# Patient Record
Sex: FEMALE | Race: WHITE | Employment: UNEMPLOYED | URBAN - METROPOLITAN AREA
[De-identification: names, ages, dates, MRNs, and addresses within clinical notes are randomized per-mention and may not be internally consistent; named-entity substitution may affect disease eponyms.]

---

## 2019-11-29 ENCOUNTER — HOSPITAL ENCOUNTER (EMERGENCY)
Age: 84
Discharge: HOME | End: 2019-11-30
Attending: EMERGENCY MEDICINE
Payer: MEDICARE

## 2019-11-29 VITALS
SYSTOLIC BLOOD PRESSURE: 124 MMHG | RESPIRATION RATE: 18 BRPM | HEART RATE: 66 BPM | TEMPERATURE: 98.5 F | DIASTOLIC BLOOD PRESSURE: 83 MMHG | WEIGHT: 149.7 LBS | OXYGEN SATURATION: 95 % | BODY MASS INDEX: 29.24 KG/M2

## 2019-11-29 DIAGNOSIS — K57.92 DIVERTICULITIS: Primary | ICD-10-CM

## 2019-11-29 LAB
BASOPHILS # BLD: 0 K/UL (ref 0–0.1)
BASOPHILS NFR BLD: 0 % (ref 0–2)
DIFFERENTIAL METHOD BLD: NORMAL
EOSINOPHIL # BLD: 0.1 K/UL (ref 0–0.4)
EOSINOPHIL NFR BLD: 2 % (ref 0–5)
ERYTHROCYTE [DISTWIDTH] IN BLOOD BY AUTOMATED COUNT: 13.5 % (ref 11.6–14.5)
HCT VFR BLD AUTO: 43.6 % (ref 35–45)
HGB BLD-MCNC: 14.1 G/DL (ref 12–16)
LACTATE BLD-SCNC: 1.97 MMOL/L (ref 0.4–2)
LYMPHOCYTES # BLD: 1.8 K/UL (ref 0.9–3.6)
LYMPHOCYTES NFR BLD: 24 % (ref 21–52)
MCH RBC QN AUTO: 30.3 PG (ref 24–34)
MCHC RBC AUTO-ENTMCNC: 32.3 G/DL (ref 31–37)
MCV RBC AUTO: 93.8 FL (ref 74–97)
MONOCYTES # BLD: 0.8 K/UL (ref 0.05–1.2)
MONOCYTES NFR BLD: 10 % (ref 3–10)
NEUTS SEG # BLD: 4.8 K/UL (ref 1.8–8)
NEUTS SEG NFR BLD: 64 % (ref 40–73)
PLATELET # BLD AUTO: 334 K/UL (ref 135–420)
PMV BLD AUTO: 10.1 FL (ref 9.2–11.8)
RBC # BLD AUTO: 4.65 M/UL (ref 4.2–5.3)
WBC # BLD AUTO: 7.5 K/UL (ref 4.6–13.2)

## 2019-11-29 PROCEDURE — 83605 ASSAY OF LACTIC ACID: CPT

## 2019-11-29 PROCEDURE — 83690 ASSAY OF LIPASE: CPT

## 2019-11-29 PROCEDURE — 99284 EMERGENCY DEPT VISIT MOD MDM: CPT

## 2019-11-29 PROCEDURE — 85025 COMPLETE CBC W/AUTO DIFF WBC: CPT

## 2019-11-29 PROCEDURE — 80053 COMPREHEN METABOLIC PANEL: CPT

## 2019-11-29 PROCEDURE — 96374 THER/PROPH/DIAG INJ IV PUSH: CPT

## 2019-11-29 RX ORDER — MEMANTINE HYDROCHLORIDE 5 MG/1
5 TABLET ORAL
COMMUNITY
Start: 2016-05-03

## 2019-11-29 RX ORDER — DONEPEZIL HYDROCHLORIDE 10 MG/1
10 TABLET, FILM COATED ORAL
COMMUNITY
Start: 2019-10-14

## 2019-11-29 RX ORDER — ONDANSETRON 2 MG/ML
8 INJECTION INTRAMUSCULAR; INTRAVENOUS
Status: COMPLETED | OUTPATIENT
Start: 2019-11-29 | End: 2019-11-30

## 2019-11-29 RX ORDER — LEVOTHYROXINE SODIUM 112 UG/1
TABLET ORAL
COMMUNITY
Start: 2019-09-16 | End: 2021-01-30

## 2019-11-29 RX ORDER — LOSARTAN POTASSIUM AND HYDROCHLOROTHIAZIDE 12.5; 5 MG/1; MG/1
TABLET ORAL
COMMUNITY
Start: 2019-09-16

## 2019-11-30 ENCOUNTER — APPOINTMENT (OUTPATIENT)
Dept: CT IMAGING | Age: 84
End: 2019-11-30
Attending: EMERGENCY MEDICINE
Payer: MEDICARE

## 2019-11-30 LAB
ALBUMIN SERPL-MCNC: 3.7 G/DL (ref 3.4–5)
ALBUMIN/GLOB SERPL: 0.9 {RATIO} (ref 0.8–1.7)
ALP SERPL-CCNC: 158 U/L (ref 45–117)
ALT SERPL-CCNC: 18 U/L (ref 13–56)
ANION GAP SERPL CALC-SCNC: 4 MMOL/L (ref 3–18)
AST SERPL-CCNC: 16 U/L (ref 10–38)
BILIRUB SERPL-MCNC: 0.5 MG/DL (ref 0.2–1)
BUN SERPL-MCNC: 24 MG/DL (ref 7–18)
BUN/CREAT SERPL: 16 (ref 12–20)
CALCIUM SERPL-MCNC: 9.4 MG/DL (ref 8.5–10.1)
CHLORIDE SERPL-SCNC: 99 MMOL/L (ref 100–111)
CO2 SERPL-SCNC: 33 MMOL/L (ref 21–32)
CREAT SERPL-MCNC: 1.46 MG/DL (ref 0.6–1.3)
GLOBULIN SER CALC-MCNC: 4.3 G/DL (ref 2–4)
GLUCOSE SERPL-MCNC: 175 MG/DL (ref 74–99)
LIPASE SERPL-CCNC: 326 U/L (ref 73–393)
POTASSIUM SERPL-SCNC: 3.3 MMOL/L (ref 3.5–5.5)
PROT SERPL-MCNC: 8 G/DL (ref 6.4–8.2)
SODIUM SERPL-SCNC: 136 MMOL/L (ref 136–145)

## 2019-11-30 PROCEDURE — 74011250636 HC RX REV CODE- 250/636: Performed by: EMERGENCY MEDICINE

## 2019-11-30 PROCEDURE — 74011636320 HC RX REV CODE- 636/320: Performed by: EMERGENCY MEDICINE

## 2019-11-30 PROCEDURE — 74177 CT ABD & PELVIS W/CONTRAST: CPT

## 2019-11-30 RX ORDER — METRONIDAZOLE 500 MG/1
500 TABLET ORAL 2 TIMES DAILY
Qty: 14 TAB | Refills: 0 | Status: SHIPPED | OUTPATIENT
Start: 2019-11-30 | End: 2019-12-07

## 2019-11-30 RX ORDER — CIPROFLOXACIN 500 MG/1
500 TABLET ORAL 2 TIMES DAILY
Qty: 14 TAB | Refills: 0 | Status: SHIPPED | OUTPATIENT
Start: 2019-11-30 | End: 2019-12-07

## 2019-11-30 RX ADMIN — ONDANSETRON 8 MG: 2 INJECTION INTRAMUSCULAR; INTRAVENOUS at 00:25

## 2019-11-30 RX ADMIN — IOPAMIDOL 100 ML: 612 INJECTION, SOLUTION INTRAVENOUS at 00:36

## 2019-11-30 RX ADMIN — SODIUM CHLORIDE, SODIUM LACTATE, POTASSIUM CHLORIDE, AND CALCIUM CHLORIDE 1000 ML: 600; 310; 30; 20 INJECTION, SOLUTION INTRAVENOUS at 02:03

## 2019-11-30 NOTE — DISCHARGE INSTRUCTIONS
Patient Education        Diverticulitis: Care Instructions  Your Care Instructions    Diverticulitis occurs when pouches form in the wall of the colon and become inflamed or infected. It can be very painful. Doctors aren't sure what causes diverticulitis. There is no proof that foods such as nuts, seeds, or berries cause it or make it worse. A low-fiber diet may cause the colon to work harder to push stool forward. Pouches may form because of this extra work. It may be hard to think about healthy eating while you're in pain. But as you recover, you might think about how you can use healthy eating for overall better health. Healthy eating may help you avoid future attacks. Follow-up care is a key part of your treatment and safety. Be sure to make and go to all appointments, and call your doctor if you are having problems. It's also a good idea to know your test results and keep a list of the medicines you take. How can you care for yourself at home? · Drink plenty of fluids, enough so that your urine is light yellow or clear like water. If you have kidney, heart, or liver disease and have to limit fluids, talk with your doctor before you increase the amount of fluids you drink. · Stick to liquids or a bland diet (plain rice, bananas, dry toast or crackers, applesauce) until you are feeling better. Then you can return to regular foods and gradually increase the amount of fiber in your diet. · Use a heating pad set on low on your belly to relieve mild cramps and pain. · Get extra rest until you are feeling better. · Be safe with medicines. Read and follow all instructions on the label. ? If the doctor gave you a prescription medicine for pain, take it as prescribed. ? If you are not taking a prescription pain medicine, ask your doctor if you can take an over-the-counter medicine. · If your doctor prescribed antibiotics, take them as directed. Do not stop taking them just because you feel better.  You need to take the full course of antibiotics. To prevent future attacks of diverticulitis  · Avoid constipation:  ? Include fruits, vegetables, beans, and whole grains in your diet each day. These foods are high in fiber. ? Drink plenty of fluids, enough so that your urine is light yellow or clear like water. If you have kidney, heart, or liver disease and have to limit fluids, talk with your doctor before you increase the amount of fluids you drink. ? Get some exercise every day. Build up slowly to 30 to 60 minutes a day on 5 or more days of the week. ? Take a fiber supplement, such as Citrucel or Metamucil, every day if needed. Read and follow all instructions on the label. ? Schedule time each day for a bowel movement. Having a daily routine may help. Take your time and do not strain when having a bowel movement. When should you call for help? Call your doctor now or seek immediate medical care if:    · You have a fever.     · You are vomiting.     · You have new or worse belly pain.     · You cannot pass stools or gas.    Watch closely for changes in your health, and be sure to contact your doctor if you have any problems. Where can you learn more? Go to http://juarez-edwar.info/. Enter H901 in the search box to learn more about \"Diverticulitis: Care Instructions. \"  Current as of: November 7, 2018  Content Version: 12.2  © 2847-0423 [employer]. Care instructions adapted under license by [employer] (which disclaims liability or warranty for this information). If you have questions about a medical condition or this instruction, always ask your healthcare professional. [place] [address] any warranty or liability for your use of this information.

## 2019-11-30 NOTE — ED TRIAGE NOTES
A&O female arrived via EMS with c/o n/v and fatigue. Patient denies abdominal pain. Denies diarrhea/constipation. States she began vomiting today but had some vomiting a few days ago as well.

## 2019-12-15 NOTE — ED PROVIDER NOTES
EMERGENCY DEPARTMENT HISTORY AND PHYSICAL EXAM      Date: 11/29/2019  Patient Name: Charisma Renteria    History of Presenting Illness     Chief Complaint   Patient presents with    Vomiting    Nausea       History (Context): Charisma Renteria is a 80 y.o. Sera Crank with complicated set of comorbid conditions as noted below, who presents with insidious onset, severe, persistent nausea and vomiting. This is associated with fatigue. There are no exacerbating/relieving features or other associated symptoms. On review of systems, the patient denies fever, chills, abdominal pain, diarrhea, back pain, chest pain, shortness of breath, diaphoresis, rashes, tick bite, recent travel. PCP: Pedro Lott MD    Current Outpatient Medications   Medication Sig Dispense Refill    memantine (NAMENDA) 5 mg tablet Take 5 mg by mouth.  donepezil (ARICEPT) 10 mg tablet       losartan-hydroCHLOROthiazide (HYZAAR) 50-12.5 mg per tablet       levothyroxine (SYNTHROID) 112 mcg tablet       ergocalciferol (VITAMIN D2) 50,000 unit capsule Take 50,000 Units by mouth every seven (7) days.  glimepiride (AMARYL) 4 mg tablet Take 4 mg by mouth every morning.  simvastatin (ZOCOR) 5 mg tablet Take 5 mg by mouth nightly.  ranitidine (ZANTAC) 150 mg tablet Take 150 mg by mouth as needed.  LORazepam (ATIVAN) 0.5 mg tablet Take 0.5 mg by mouth two (2) times daily as needed.  oxybutynin (DITROPAN) 5 mg tablet Take 5 mg by mouth three (3) times daily. Past History     Past Medical History:  Past Medical History:   Diagnosis Date    [facility]     Diabetes [facility]     Hypertension     Hypothyroid        Past Surgical History:  Past Surgical History:   Procedure Laterality Date    BREAST SURGERY PROCEDURE UNLISTED      HX APPENDECTOMY      HX MASTECTOMY         Family History:  History reviewed. No pertinent family history.     Social History:  Social History     Tobacco Use    Smoking status: Never Smoker    Smokeless tobacco: Never Used   Substance Use Topics    Alcohol use: No    Drug use: No       Allergies:  No Known Allergies      Review of Systems   As per HPI, otherwise reviewed and negative. Physical Exam     Vitals:    11/29/19 2305   BP: 124/83   Pulse: 66   Resp: 18   Temp: 98.5 °F (36.9 °C)   SpO2: 95%   Weight: 67.9 kg (149 lb 11.2 oz)       Gen: chronically ill-appearing in NAD  HEENT: Normocephalic, sclera anicteric  Cardiovascular: Normal rate, regular rhythm, no murmurs, rubs, gallops. Pulses intact and equal distally. Pulmonary: No respiratory distress. No stridor. Clear lungs. ABD: Soft, nontender, nondistended, +BS. Neuro: Alert. Normal speech. Normal mentation. Psych: Normal thought content and thought processes. : No CVA tenderness  EXT: Moves all extremities well. No cyanosis or clubbing. Skin: Warm and well-perfused. Diagnostic Study Results     Labs -   No results found for this or any previous visit (from the past 12 hour(s)). Radiologic Studies -   CT ABD PELV W CONT   Final Result   IMPRESSION:      1. Diffuse diverticulosis without definite acute diverticulitis. However, there   is questionable subtle pericolonic stranding adjacent to the mid to distal   sigmoid colon which may be related to scarring from prior hysterectomy although   very early/mild acute diverticulitis is possible. 2. Otherwise, no acute inflammatory process in the abdomen or pelvis. 3. Mild bibasilar atelectasis. No consolidation, effusion, or pneumothorax. 4. Small hiatal hernia and additional chronic findings as above. 5. Prior hysterectomy and cholecystectomy. 6. Evidence of pulmonary arterial hypertension, a completely assessed. Initial preliminary report was provided to the ED by the on-call radiology   resident. Initial preliminary report was provided to the ED by the on-call radiology   resident.         CT Results  (Last 48 hours)    None CXR Results  (Last 48 hours)    None            Medical Decision Making   I am the first provider for this patient. I reviewed the vital signs, available nursing notes, past medical history, past surgical history, family history and social history. Vital Signs-Reviewed the patient's vital signs. EKG: Interpreted by myself. Records Reviewed: By myself personally on initial evaluation    MDM:   Patient presents with nausea and vomiting. Exam significant for chronically ill appealing elderly woman. DDX considered: Gastroparesis, gastroenteritis, cyclical vomiting syndrome, gastritis, peptic ulcer disease, cholecystitis, choledocholithiasis, SBO, functional abdominal pain, acute intermittent porphyria, gastroparesis  DDX thought to be less likely but also considered due to high risk condition: Cholangitis, mesenteric ischemia. Plan:   Pain Control  Antiemetics  Close Observation    Orders as below:  Orders Placed This Encounter    CT ABD PELV W CONT    CBC WITH AUTOMATED DIFF    COMPREHENSIVE METABOLIC PANEL    LIPASE    POC LACTIC ACID    donepezil (ARICEPT) 10 mg tablet    losartan-hydroCHLOROthiazide (HYZAAR) 50-12.5 mg per tablet    memantine (NAMENDA) 5 mg tablet    levothyroxine (SYNTHROID) 112 mcg tablet    lactated ringers bolus infusion 1,000 mL    ondansetron (ZOFRAN) injection 8 mg    iopamidol (ISOVUE 300) 61 % contrast injection 100 mL    ciprofloxacin HCl (CIPRO) 500 mg tablet    metroNIDAZOLE (FLAGYL) 500 mg tablet        ED Course:   CT demonstrates likely diverticulitis. Patient will be started on antibiotics. Disposition:  Discharge home    DISCHARGE NOTE:   Pt has been reexamined. Patient has no new complaints, changes, or physical findings. Care plan outlined and precautions discussed. Results were reviewed with the patient. All medications were reviewed with the patient; will d/c home with antibiotics. All of pt's questions and concerns were addressed. Alarm symptoms and return precautions were discussed in detail with the patient. The patient demonstrates adequate understanding. Patient was instructed and agrees to follow up with PCP, as well as to return to the ED upon further deterioration. Patient is ready to go home. The patient understands and agrees with this plan. Patient is very happy with our care. Follow-up Information     Follow up With Specialties Details Why [address] [facility] EMERGENCY DEPT Emergency Medicine  As needed, If symptoms worsen [address]  [phone]          Discharge Medication List as of 11/30/2019  2:20 AM      START taking these medications    Details   ciprofloxacin HCl (CIPRO) 500 mg tablet Take 1 Tab by mouth two (2) times a day for 7 days. , Print, Disp-14 Tab, R-0      metroNIDAZOLE (FLAGYL) 500 mg tablet Take 1 Tab by mouth two (2) times a day for 7 days. , Print, Disp-14 Tab, R-0         CONTINUE these medications which have NOT CHANGED    Details   memantine (NAMENDA) 5 mg tablet Take 5 mg by mouth., Historical Med      donepezil (ARICEPT) 10 mg tablet Historical Med      losartan-hydroCHLOROthiazide (HYZAAR) 50-12.5 mg per tablet Historical Med      levothyroxine (SYNTHROID) 112 mcg tablet Historical Med      ergocalciferol (VITAMIN D2) 50,000 unit capsule Take 50,000 Units by mouth every seven (7) days. , Historical Med      glimepiride (AMARYL) 4 mg tablet Take 4 mg by mouth every morning. Historical Med, 4 mg      simvastatin (ZOCOR) 5 mg tablet Take 5 mg by mouth nightly. Historical Med, 5 mg      ranitidine (ZANTAC) 150 mg tablet Take 150 mg by mouth as needed. Historical Med, 150 mg      LORazepam (ATIVAN) 0.5 mg tablet Take 0.5 mg by mouth two (2) times daily as needed. Historical Med, 0.5 mg      oxybutynin (DITROPAN) 5 mg tablet Take 5 mg by mouth three (3) times daily. Historical Med, 5 mg             Procedures:  Procedures      Critical Care Time:       Diagnosis     Clinical Impression:   1. Diverticulitis        Signed,  Elena Crane MD  Emergency Physician  [facility]    As a voice dictation software was utilized to dictate this note, minor word transpositions can occur. I apologize for confusing wording and typographic errors. Please feel free to contact me for clarification.

## 2021-01-26 ENCOUNTER — APPOINTMENT (OUTPATIENT)
Dept: GENERAL RADIOLOGY | Age: 86
DRG: 312 | End: 2021-01-26
Attending: EMERGENCY MEDICINE
Payer: MEDICARE

## 2021-01-26 ENCOUNTER — HOSPITAL ENCOUNTER (INPATIENT)
Age: 86
LOS: 2 days | Discharge: HOME HEALTH | DRG: 312 | End: 2021-01-30
Attending: EMERGENCY MEDICINE | Admitting: INTERNAL MEDICINE
Payer: MEDICARE

## 2021-01-26 ENCOUNTER — APPOINTMENT (OUTPATIENT)
Dept: CT IMAGING | Age: 86
DRG: 312 | End: 2021-01-26
Attending: EMERGENCY MEDICINE
Payer: MEDICARE

## 2021-01-26 DIAGNOSIS — R00.1 BRADYCARDIA: ICD-10-CM

## 2021-01-26 DIAGNOSIS — S61.512A LACERATION OF LEFT WRIST, INITIAL ENCOUNTER: ICD-10-CM

## 2021-01-26 DIAGNOSIS — R55 SYNCOPE, UNSPECIFIED SYNCOPE TYPE: Primary | ICD-10-CM

## 2021-01-26 LAB
ALBUMIN SERPL-MCNC: 3.5 G/DL (ref 3.4–5)
ALBUMIN/GLOB SERPL: 0.8 {RATIO} (ref 0.8–1.7)
ALP SERPL-CCNC: 120 U/L (ref 45–117)
ALT SERPL-CCNC: 15 U/L (ref 13–56)
ANION GAP SERPL CALC-SCNC: 3 MMOL/L (ref 3–18)
AST SERPL-CCNC: 21 U/L (ref 10–38)
BASOPHILS # BLD: 0 K/UL (ref 0–0.1)
BASOPHILS NFR BLD: 0 % (ref 0–2)
BILIRUB SERPL-MCNC: 0.5 MG/DL (ref 0.2–1)
BUN SERPL-MCNC: 24 MG/DL (ref 7–18)
BUN/CREAT SERPL: 16 (ref 12–20)
CALCIUM SERPL-MCNC: 9.2 MG/DL (ref 8.5–10.1)
CHLORIDE SERPL-SCNC: 105 MMOL/L (ref 100–111)
CK MB CFR SERPL CALC: 1 % (ref 0–4)
CK MB SERPL-MCNC: 1.3 NG/ML (ref 5–25)
CK SERPL-CCNC: 125 U/L (ref 26–192)
CO2 SERPL-SCNC: 31 MMOL/L (ref 21–32)
CREAT SERPL-MCNC: 1.51 MG/DL (ref 0.6–1.3)
DIFFERENTIAL METHOD BLD: ABNORMAL
EOSINOPHIL # BLD: 0 K/UL (ref 0–0.4)
EOSINOPHIL NFR BLD: 1 % (ref 0–5)
ERYTHROCYTE [DISTWIDTH] IN BLOOD BY AUTOMATED COUNT: 13.7 % (ref 11.6–14.5)
GLOBULIN SER CALC-MCNC: 4.6 G/DL (ref 2–4)
GLUCOSE SERPL-MCNC: 143 MG/DL (ref 74–99)
HCT VFR BLD AUTO: 41 % (ref 35–45)
HGB BLD-MCNC: 13 G/DL (ref 12–16)
LYMPHOCYTES # BLD: 1.2 K/UL (ref 0.9–3.6)
LYMPHOCYTES NFR BLD: 16 % (ref 21–52)
MCH RBC QN AUTO: 30.7 PG (ref 24–34)
MCHC RBC AUTO-ENTMCNC: 31.7 G/DL (ref 31–37)
MCV RBC AUTO: 96.7 FL (ref 74–97)
MONOCYTES # BLD: 0.9 K/UL (ref 0.05–1.2)
MONOCYTES NFR BLD: 12 % (ref 3–10)
NEUTS SEG # BLD: 5.4 K/UL (ref 1.8–8)
NEUTS SEG NFR BLD: 71 % (ref 40–73)
PLATELET # BLD AUTO: 281 K/UL (ref 135–420)
PMV BLD AUTO: 10.2 FL (ref 9.2–11.8)
POTASSIUM SERPL-SCNC: 3.8 MMOL/L (ref 3.5–5.5)
PROT SERPL-MCNC: 8.1 G/DL (ref 6.4–8.2)
RBC # BLD AUTO: 4.24 M/UL (ref 4.2–5.3)
SODIUM SERPL-SCNC: 139 MMOL/L (ref 136–145)
TROPONIN I SERPL-MCNC: 0.03 NG/ML (ref 0–0.04)
WBC # BLD AUTO: 7.6 K/UL (ref 4.6–13.2)

## 2021-01-26 PROCEDURE — 84443 ASSAY THYROID STIM HORMONE: CPT

## 2021-01-26 PROCEDURE — 81001 URINALYSIS AUTO W/SCOPE: CPT

## 2021-01-26 PROCEDURE — 80053 COMPREHEN METABOLIC PANEL: CPT

## 2021-01-26 PROCEDURE — 71045 X-RAY EXAM CHEST 1 VIEW: CPT

## 2021-01-26 PROCEDURE — 93005 ELECTROCARDIOGRAM TRACING: CPT

## 2021-01-26 PROCEDURE — 82553 CREATINE MB FRACTION: CPT

## 2021-01-26 PROCEDURE — 99285 EMERGENCY DEPT VISIT HI MDM: CPT

## 2021-01-26 PROCEDURE — 70450 CT HEAD/BRAIN W/O DYE: CPT

## 2021-01-26 PROCEDURE — 85025 COMPLETE CBC W/AUTO DIFF WBC: CPT

## 2021-01-27 ENCOUNTER — APPOINTMENT (OUTPATIENT)
Dept: NON INVASIVE DIAGNOSTICS | Age: 86
DRG: 312 | End: 2021-01-27
Attending: HOSPITALIST
Payer: MEDICARE

## 2021-01-27 LAB
APPEARANCE UR: ABNORMAL
ATRIAL RATE: 52 BPM
BACTERIA URNS QL MICRO: ABNORMAL /HPF
BILIRUB UR QL: NEGATIVE
CALCULATED P AXIS, ECG09: 59 DEGREES
CALCULATED R AXIS, ECG10: 15 DEGREES
CALCULATED T AXIS, ECG11: -162 DEGREES
COLOR UR: YELLOW
DIAGNOSIS, 93000: NORMAL
ECHO PV REGURGITANT MAX VELOCITY: 349.34 CM/S
ECHO TV REGURGITANT MAX VELOCITY: 349.34 CM/S
ECHO TV REGURGITANT PEAK GRADIENT: 48.8 MMHG
EPITH CASTS URNS QL MICRO: ABNORMAL /LPF (ref 0–5)
EST. AVERAGE GLUCOSE BLD GHB EST-MCNC: 117 MG/DL
GLUCOSE BLD STRIP.AUTO-MCNC: 107 MG/DL (ref 70–110)
GLUCOSE BLD STRIP.AUTO-MCNC: 122 MG/DL (ref 70–110)
GLUCOSE BLD STRIP.AUTO-MCNC: 87 MG/DL (ref 70–110)
GLUCOSE BLD STRIP.AUTO-MCNC: 87 MG/DL (ref 70–110)
GLUCOSE UR STRIP.AUTO-MCNC: NEGATIVE MG/DL
HBA1C MFR BLD: 5.7 % (ref 4.2–5.6)
HGB UR QL STRIP: NEGATIVE
HYALINE CASTS URNS QL MICRO: ABNORMAL /LPF (ref 0–2)
KETONES UR QL STRIP.AUTO: ABNORMAL MG/DL
LEUKOCYTE ESTERASE UR QL STRIP.AUTO: ABNORMAL
NITRITE UR QL STRIP.AUTO: POSITIVE
P-R INTERVAL, ECG05: 152 MS
PH UR STRIP: 5 [PH] (ref 5–8)
PROT UR STRIP-MCNC: 30 MG/DL
Q-T INTERVAL, ECG07: 500 MS
QRS DURATION, ECG06: 150 MS
QTC CALCULATION (BEZET), ECG08: 465 MS
RBC #/AREA URNS HPF: ABNORMAL /HPF (ref 0–5)
SP GR UR REFRACTOMETRY: 1.03 (ref 1–1.03)
TSH SERPL DL<=0.05 MIU/L-ACNC: 5.14 UIU/ML (ref 0.36–3.74)
URATE CRY URNS QL MICRO: ABNORMAL
UROBILINOGEN UR QL STRIP.AUTO: 1 EU/DL (ref 0.2–1)
VENTRICULAR RATE, ECG03: 52 BPM
WBC URNS QL MICRO: ABNORMAL /HPF (ref 0–4)

## 2021-01-27 PROCEDURE — 2709999900 HC NON-CHARGEABLE SUPPLY

## 2021-01-27 PROCEDURE — 75810000293 HC SIMP/SUPERF WND  RPR

## 2021-01-27 PROCEDURE — 82962 GLUCOSE BLOOD TEST: CPT

## 2021-01-27 PROCEDURE — 83036 HEMOGLOBIN GLYCOSYLATED A1C: CPT

## 2021-01-27 PROCEDURE — 74011000258 HC RX REV CODE- 258: Performed by: INTERNAL MEDICINE

## 2021-01-27 PROCEDURE — 74011250636 HC RX REV CODE- 250/636: Performed by: HOSPITALIST

## 2021-01-27 PROCEDURE — 0HQGXZZ REPAIR LEFT HAND SKIN, EXTERNAL APPROACH: ICD-10-PCS | Performed by: HOSPITALIST

## 2021-01-27 PROCEDURE — 96372 THER/PROPH/DIAG INJ SC/IM: CPT

## 2021-01-27 PROCEDURE — 74011250636 HC RX REV CODE- 250/636: Performed by: INTERNAL MEDICINE

## 2021-01-27 PROCEDURE — 99218 HC RM OBSERVATION: CPT

## 2021-01-27 PROCEDURE — 93321 DOPPLER ECHO F-UP/LMTD STD: CPT

## 2021-01-27 PROCEDURE — 74011250637 HC RX REV CODE- 250/637: Performed by: HOSPITALIST

## 2021-01-27 PROCEDURE — 96374 THER/PROPH/DIAG INJ IV PUSH: CPT

## 2021-01-27 RX ORDER — MAGNESIUM SULFATE 100 %
4 CRYSTALS MISCELLANEOUS AS NEEDED
Status: DISCONTINUED | OUTPATIENT
Start: 2021-01-27 | End: 2021-01-31 | Stop reason: HOSPADM

## 2021-01-27 RX ORDER — ATORVASTATIN CALCIUM 10 MG/1
10 TABLET, FILM COATED ORAL
Status: DISCONTINUED | OUTPATIENT
Start: 2021-01-27 | End: 2021-01-31 | Stop reason: HOSPADM

## 2021-01-27 RX ORDER — ENOXAPARIN SODIUM 100 MG/ML
30 INJECTION SUBCUTANEOUS DAILY
Status: DISCONTINUED | OUTPATIENT
Start: 2021-01-27 | End: 2021-01-28 | Stop reason: DRUGHIGH

## 2021-01-27 RX ORDER — PROMETHAZINE HYDROCHLORIDE 25 MG/1
12.5 TABLET ORAL
Status: DISCONTINUED | OUTPATIENT
Start: 2021-01-27 | End: 2021-01-31 | Stop reason: HOSPADM

## 2021-01-27 RX ORDER — SODIUM CHLORIDE 0.9 % (FLUSH) 0.9 %
5-40 SYRINGE (ML) INJECTION EVERY 8 HOURS
Status: DISCONTINUED | OUTPATIENT
Start: 2021-01-27 | End: 2021-01-31 | Stop reason: HOSPADM

## 2021-01-27 RX ORDER — MEMANTINE HYDROCHLORIDE 5 MG/1
5 TABLET ORAL DAILY
Status: DISCONTINUED | OUTPATIENT
Start: 2021-01-27 | End: 2021-01-31 | Stop reason: HOSPADM

## 2021-01-27 RX ORDER — DONEPEZIL HYDROCHLORIDE 5 MG/1
5 TABLET, FILM COATED ORAL DAILY
Status: DISCONTINUED | OUTPATIENT
Start: 2021-01-27 | End: 2021-01-31 | Stop reason: HOSPADM

## 2021-01-27 RX ORDER — SODIUM CHLORIDE 9 MG/ML
75 INJECTION, SOLUTION INTRAVENOUS CONTINUOUS
Status: DISCONTINUED | OUTPATIENT
Start: 2021-01-27 | End: 2021-01-30

## 2021-01-27 RX ORDER — POLYETHYLENE GLYCOL 3350 17 G/17G
17 POWDER, FOR SOLUTION ORAL DAILY PRN
Status: DISCONTINUED | OUTPATIENT
Start: 2021-01-27 | End: 2021-01-31 | Stop reason: HOSPADM

## 2021-01-27 RX ORDER — ONDANSETRON 2 MG/ML
4 INJECTION INTRAMUSCULAR; INTRAVENOUS
Status: DISCONTINUED | OUTPATIENT
Start: 2021-01-27 | End: 2021-01-31 | Stop reason: HOSPADM

## 2021-01-27 RX ORDER — SODIUM CHLORIDE 0.9 % (FLUSH) 0.9 %
5-40 SYRINGE (ML) INJECTION AS NEEDED
Status: DISCONTINUED | OUTPATIENT
Start: 2021-01-27 | End: 2021-01-31 | Stop reason: HOSPADM

## 2021-01-27 RX ORDER — ACETAMINOPHEN 650 MG/1
650 SUPPOSITORY RECTAL
Status: DISCONTINUED | OUTPATIENT
Start: 2021-01-27 | End: 2021-01-31 | Stop reason: HOSPADM

## 2021-01-27 RX ORDER — ACETAMINOPHEN 325 MG/1
650 TABLET ORAL
Status: DISCONTINUED | OUTPATIENT
Start: 2021-01-27 | End: 2021-01-31 | Stop reason: HOSPADM

## 2021-01-27 RX ORDER — INSULIN LISPRO 100 [IU]/ML
INJECTION, SOLUTION INTRAVENOUS; SUBCUTANEOUS
Status: DISCONTINUED | OUTPATIENT
Start: 2021-01-27 | End: 2021-01-31 | Stop reason: HOSPADM

## 2021-01-27 RX ORDER — DEXTROSE MONOHYDRATE 25 G/50ML
25-50 INJECTION, SOLUTION INTRAVENOUS AS NEEDED
Status: DISCONTINUED | OUTPATIENT
Start: 2021-01-27 | End: 2021-01-31 | Stop reason: HOSPADM

## 2021-01-27 RX ADMIN — MEMANTINE 5 MG: 5 TABLET ORAL at 08:27

## 2021-01-27 RX ADMIN — Medication 10 ML: at 05:07

## 2021-01-27 RX ADMIN — ATORVASTATIN CALCIUM 10 MG: 10 TABLET, FILM COATED ORAL at 21:13

## 2021-01-27 RX ADMIN — LEVOTHYROXINE SODIUM 100 MCG: 0.03 TABLET ORAL at 08:27

## 2021-01-27 RX ADMIN — Medication 10 ML: at 14:12

## 2021-01-27 RX ADMIN — Medication 10 ML: at 22:00

## 2021-01-27 RX ADMIN — CEFTRIAXONE 1 G: 1 INJECTION, POWDER, FOR SOLUTION INTRAMUSCULAR; INTRAVENOUS at 08:26

## 2021-01-27 RX ADMIN — SODIUM CHLORIDE 75 ML/HR: 900 INJECTION, SOLUTION INTRAVENOUS at 05:07

## 2021-01-27 RX ADMIN — ENOXAPARIN SODIUM 30 MG: 30 INJECTION SUBCUTANEOUS at 08:27

## 2021-01-27 RX ADMIN — DONEPEZIL HYDROCHLORIDE 5 MG: 5 TABLET, FILM COATED ORAL at 09:31

## 2021-01-27 NOTE — PROGRESS NOTES
Problem: Falls - Risk of  Goal: *Absence of Falls  Description: Document Earma Yazmin Fall Risk and appropriate interventions in the flowsheet.   Outcome: Progressing Towards Goal  Note: Fall Risk Interventions:  Mobility Interventions: Bed/chair exit alarm, Patient to call before getting OOB, Strengthening exercises (ROM-active/passive), Utilize walker, cane, or other assistive device    Mentation Interventions: Door open when patient unattended, Bed/chair exit alarm, Adequate sleep, hydration, pain control, More frequent rounding, Toileting rounds, Update white board    Medication Interventions: Evaluate medications/consider consulting pharmacy, Bed/chair exit alarm    Elimination Interventions: Call light in reach, Bed/chair exit alarm, Toileting schedule/hourly rounds, Stay With Me (per policy)

## 2021-01-27 NOTE — PROGRESS NOTES
This nurse Preceptoring and [address] Javier Bean RN ([place]). Patient pulled out her RA peripheral IV; see lines. Patient refusing new peripheral IV placement said \"get out of here. \" Call bell w/in reach.

## 2021-01-27 NOTE — ED NOTES
TRANSFER - OUT REPORT:    Verbal report given to [facility] RN on [address]  being transferred to 2200 (unit) for routine progression of care       Report consisted of patients Situation, Background, Assessment and   Recommendations(SBAR). Information from the following report(s) SBAR, ED Summary, [facility] and Recent Results was reviewed with the receiving nurse. Lines:       Opportunity for questions and clarification was provided.       Patient transported with:   Monitor  Registered Nurse

## 2021-01-27 NOTE — H&P
Medicine History and Physical    Patient: Demar Bradley   Age:  80 y.o. Assessment   Principal Problem:    Syncope (5/18/2014)    Active Problems:    DM II (diabetes mellitus, type II[facility] (5/18/2014)          Plan     Syncope   -echo, orthostatics, tele monitor, IVF    DM   - SSI    Hold any HTN meds    DISPO    Anticipated Date of Discharge: 1 days  Anticipated Disposition (home, SNF) :home    Chief Complaint:   Chief Complaint   Patient presents with    Syncope         HPI:   Demar Bradley is a 80y.o. year old female who presents with syncope. She apparently has \"passed out\" twice in about a week both about a minute each at the dinner table. No symptoms previously were reported and post syncope she was confused but returned to baseline. This am when seen she is lethargic      Review of Systems - positive responses in bold type   Constitutional: Negative for fever, chills, diaphoresis and unexpected weight change. HENT: Negative for ear pain, congestion, sore throat, rhinorrhea, drooling, trouble swallowing, neck pain and tinnitus. Eyes: Negative for photophobia, pain, redness and visual disturbance. Respiratory: negative for shortness of breath, cough, choking, chest tightness, wheezing or stridor. Cardiovascular: Negative for chest pain, palpitations and leg swelling. Gastrointestinal: Negative for nausea, vomiting, abdominal pain, diarrhea, constipation, blood in stool, abdominal distention and anal bleeding. Genitourinary: Negative for dysuria, urgency, frequency, hematuria, flank pain and difficulty urinating. Musculoskeletal: Negative for back pain and arthralgias. Skin: Negative for color change, rash and wound. Neurological: Negative for dizziness, seizures, syncope, speech difficulty, light-headedness or headaches. Hematological: Does not bruise/bleed easily.    Psychiatric/Behavioral: Negative for suicidal ideas, hallucinations, behavioral problems, self-injury or agitation       Past Medical History:  Past Medical History:   Diagnosis Date    [facility]     [facility]     Hypertension     Hypothyroid        Past Surgical History:  Past Surgical History:   Procedure Laterality Date    HX APPENDECTOMY      HX MASTECTOMY      MS BREAST SURGERY PROCEDURE UNLISTED         Family History:  History reviewed. No pertinent family history. Social History:  Social History     Socioeconomic History    Marital status: SINGLE     Spouse name: Not on file    Number of children: Not on file    Years of education: Not on file    Highest education level: Not on file   Tobacco Use    Smoking status: Never Smoker    Smokeless tobacco: Never Used   Substance and Sexual Activity    Alcohol use: No    Drug use: No       Home Medications:  Prior to Admission medications    Medication Sig Start Date End Date Taking? Authorizing Provider   donepezil (ARICEPT) 10 mg tablet  10/14/19   Other, MD Veronica   losartan-hydroCHLOROthiazide (HYZAAR) 50-12.5 mg per tablet  9/16/19   Other, MD Veronica   memantine (NAMENDA) 5 mg tablet Take 5 mg by mouth. 5/3/16   Other, MD Veronica   levothyroxine (SYNTHROID) 112 mcg tablet  9/16/19   Other, MD Veronica   ergocalciferol (VITAMIN D2) 50,000 unit capsule Take 50,000 Units by mouth every seven (7) days. Other, MD Veronica   glimepiride (AMARYL) 4 mg tablet Take 4 mg by mouth every morning. Provider, Historical   simvastatin (ZOCOR) 5 mg tablet Take 5 mg by mouth nightly. Provider, Historical   ranitidine (ZANTAC) 150 mg tablet Take 150 mg by mouth as needed. Provider, Historical   LORazepam (ATIVAN) 0.5 mg tablet Take 0.5 mg by mouth two (2) times daily as needed. Provider, Historical   oxybutynin (DITROPAN) 5 mg tablet Take 5 mg by mouth three (3) times daily.     Provider, Historical       Allergies:  No Known Allergies        Physical Exam:     Visit Vitals  BP (!) 98/52 (BP 1 Location: Right arm, BP Patient Position: At rest)   Pulse (!) 53   Temp 97.8 °F (36.6 °C)   Resp 16   Ht 5' (1.524 m)   Wt 67.6 kg (149 lb)   SpO2 98%   BMI 29.10 kg/m²       Physical Exam:  General appearance: lethargic, no distress, appears stated age  Head: Normocephalic, without obvious abnormality, atraumatic  Neck: supple, trachea midline  Lungs: clear to auscultation bilaterally  Heart: sinus lucia  Abdomen: soft, non-tender. Bowel sounds normal. No masses,  no organomegaly  Extremities: extremities normal, atraumatic, no cyanosis or edema  Skin: Skin color, texture, turgor normal. No rashes or lesions  Neurologic: lethargic , nonfocal    Intake and Output:  Current Shift:  No intake/output data recorded. Last three shifts:  No intake/output data recorded.     Lab/Data Reviewed:  CMP:   Lab Results   Component Value Date/Time     01/26/2021 10:36 PM    K 3.8 01/26/2021 10:36 PM     01/26/2021 10:36 PM    CO2 31 01/26/2021 10:36 PM    AGAP 3 01/26/2021 10:36 PM     (H) 01/26/2021 10:36 PM    BUN 24 (H) 01/26/2021 10:36 PM    CREA 1.51 (H) 01/26/2021 10:36 PM    GFRAA 39 (L) 01/26/2021 10:36 PM    GFRNA 32 (L) 01/26/2021 10:36 PM    CA 9.2 01/26/2021 10:36 PM    ALB 3.5 01/26/2021 10:36 PM    TP 8.1 01/26/2021 10:36 PM    GLOB 4.6 (H) 01/26/2021 10:36 PM    AGRAT 0.8 01/26/2021 10:36 PM    ALT 15 01/26/2021 10:36 PM     CBC:   Lab Results   Component Value Date/Time    WBC 7.6 01/26/2021 11:25 PM    HGB 13.0 01/26/2021 11:25 PM    HCT 41.0 01/26/2021 11:25 PM     01/26/2021 11:25 PM     All Cardiac Markers in the last 24 hours:   Lab Results   Component Value Date/Time     01/26/2021 10:36 PM    CKMB 1.3 01/26/2021 10:36 PM    CKND1 1.0 01/26/2021 10:36 PM    TROIQ 0.03 01/26/2021 10:36 PM       Valeria Clayton MD    January 27, 2021

## 2021-01-27 NOTE — PROGRESS NOTES
Problem: Discharge Planning  Goal: *Discharge to safe environment             Reason for Admission:   syncope                   RUR Score:          n/a           Plan for utilizing home health:     possible     PCP: First and Last name:  Radha Bull   Name of Practice:    Are you a current patient: Yes/No:    Approximate date of last visit:    Can you participate in a virtual visit with your PCP:                     Current Advanced Directive/Advance Care Plan: none                         Transition of Care Plan:    Unable to arouse pt to speak with me. Nurse in  she woke pt, she fell back to sleep, will speak with her in am.  Placed obs letters on chart but not able to explain to pt. Patient and/or next of kin has been given and has signed the [facility] Outpatient Observation  Notification letter and all questions answered. Copy of this notice given to patient and copy placed on chart. Patient and/or next of kin has been given the Outpatient Observation Information and Notification letter and all questions answered.

## 2021-01-27 NOTE — ROUTINE PROCESS
TRANSFER - IN REPORT: 
 
Verbal report received from [facility] Soler RN(name) on Casey Canales  being received from ED(unit) for routine progression of care Report consisted of patients Situation, Background, Assessment and  
Recommendations(SBAR). Information from the following report(s) SBAR, Kardex, Intake/Output and Recent Results was reviewed with the receiving nurse. Opportunity for questions and clarification was provided. Assessment completed upon patients arrival to unit and care assumed.

## 2021-01-27 NOTE — PROGRESS NOTES
Patient is unable to communicate at this time.  offered prayer by bedside of patient. Chaplains will continue to follow and will provide pastoral care on an as needed/requested basis.     [facility]   Staff [address] [facility]   [phone]

## 2021-01-27 NOTE — PROGRESS NOTES
Problem: Falls - Risk of 
Goal: *Absence of Falls Description: Document Bard Grant Fall Risk and appropriate interventions in the flowsheet. Outcome: Progressing Towards Goal 
Note: Fall Risk Interventions: 
Mobility Interventions: Bed/chair exit alarm, Patient to call before getting OOB, Strengthening exercises (ROM-active/passive), Utilize walker, cane, or other assistive device Mentation Interventions: Bed/chair exit alarm, Door open when patient unattended, Increase mobility, More frequent rounding, Reorient patient, Room close to nurse's station Medication Interventions: Bed/chair exit alarm, Evaluate medications/consider consulting pharmacy, Patient to call before getting OOB Elimination Interventions: Bed/chair exit alarm, Call light in reach, Patient to call for help with toileting needs, Toilet paper/wipes in reach, Toileting schedule/hourly rounds Problem: Discharge Planning Goal: *Discharge to safe environment 1/27/2021 1704 by Violette Jackson Outcome: Progressing Towards Goal 
1/27/2021 1704 by Violette Jackson Outcome: Progressing Towards Goal

## 2021-01-27 NOTE — ED TRIAGE NOTES
Pt to ED via EMS with complaints of syncopal episode that occurred at dinner table this evening. Per EMS pts family reports two syncopal episodes within the last month.

## 2021-01-27 NOTE — ED PROVIDER NOTES
HPI   20-year-old -American female brought in by EMS from home with a chief complaint of syncope. EMS providers reported that the patient was sitting at her kitchen table when she passed out for approximately 1 minute. She was confused after passing out but was able to return to baseline mental status prior to arrival in the emergency room. Patient sustained a laceration to her left lateral wrist.  Past Medical History:   Diagnosis Date    [facility] ([facility]     Diabetes ([facility]     Hypertension     Hypothyroid        Past Surgical History:   Procedure Laterality Date    HX APPENDECTOMY      HX MASTECTOMY      MD BREAST SURGERY PROCEDURE UNLISTED           History reviewed. No pertinent family history.     Social History     Socioeconomic History    Marital status: SINGLE     Spouse name: Not on file    Number of children: Not on file    Years of education: Not on file    Highest education level: Not on file   Occupational History    Not on file   Social Needs    Financial resource strain: Not on file    Food insecurity     Worry: Not on file     Inability: Not on file    Transportation needs     Medical: Not on file     Non-medical: Not on file   Tobacco Use    Smoking status: Never Smoker    Smokeless tobacco: Never Used   Substance and Sexual Activity    Alcohol use: No    Drug use: No    Sexual activity: Not on file   Lifestyle    Physical activity     Days per week: Not on file     Minutes per session: Not on file    Stress: Not on file   Relationships    Social connections     Talks on phone: Not on file     Gets together: Not on file     Attends [employer] service: Not on file     Active member of club or organization: Not on file     Attends meetings of clubs or organizations: Not on file     Relationship status: Not on file    Intimate partner violence     Fear of current or ex partner: Not on file     Emotionally abused: Not on file     Physically abused: Not on file Forced sexual activity: Not on file   Other Topics Concern    Not on file   Social History Narrative    Not on file         ALLERGIES: Patient has no known allergies. Review of Systems   Constitutional: Negative for chills, diaphoresis, fatigue and fever. HENT: Negative for congestion, dental problem, ear discharge, ear pain, hearing loss, nosebleeds, postnasal drip, sinus pressure and sore throat. Eyes: Negative for photophobia, pain, discharge, redness and visual disturbance. Respiratory: Negative for cough, chest tightness, shortness of breath, wheezing and stridor. Cardiovascular: Negative for chest pain, palpitations and leg swelling. Gastrointestinal: Negative for abdominal distention, abdominal pain, anal bleeding, blood in stool, constipation, diarrhea, nausea and vomiting. Endocrine: Negative for polydipsia, polyphagia and polyuria. Genitourinary: Negative for difficulty urinating, dyspareunia, dysuria, flank pain, frequency, urgency, vaginal bleeding, vaginal discharge and vaginal pain. Musculoskeletal: Negative for arthralgias, back pain, joint swelling, myalgias, neck pain and neck stiffness. Skin: Negative for color change, rash and wound. Allergic/Immunologic: Negative for immunocompromised state. Neurological: Positive for syncope. Negative for dizziness, tremors, seizures, weakness, light-headedness, numbness and headaches. Hematological: Negative for adenopathy. Does not bruise/bleed easily. Psychiatric/Behavioral: Negative for agitation, confusion, decreased concentration, hallucinations, sleep disturbance and suicidal ideas. The patient is not nervous/anxious. All other systems reviewed and are negative. Vitals:    01/26/21 2159   BP: (!) 98/52   Pulse: (!) 53   Resp: 16   Temp: 97.8 °F (36.6 °C)   SpO2: 98%   Weight: 67.6 kg (149 lb)   Height: 5' (1.524 m)            Physical Exam  Vitals signs and nursing note reviewed.    Constitutional:       General: She is not in acute distress. Appearance: Normal appearance. She is well-developed and normal weight. She is not diaphoretic. HENT:      Head: Normocephalic and atraumatic. Right Ear: Tympanic membrane, ear canal and external ear normal.      Left Ear: Tympanic membrane, ear canal and external ear normal.      Nose: Nose normal.      Mouth/Throat:      Mouth: Mucous membranes are moist.      Pharynx: Oropharynx is clear. No oropharyngeal exudate. Eyes:      General: No scleral icterus. Right eye: No discharge. Left eye: No discharge. Conjunctiva/sclera: Conjunctivae normal.      Pupils: Pupils are equal, round, and reactive to light. Neck:      Musculoskeletal: Normal range of motion and neck supple. No muscular tenderness. Thyroid: No thyromegaly. Vascular: No JVD. Trachea: No tracheal deviation. Cardiovascular:      Rate and Rhythm: Normal rate and regular rhythm. Heart sounds: Normal heart sounds. No murmur. No friction rub. No gallop. Pulmonary:      Effort: Pulmonary effort is normal. No respiratory distress. Breath sounds: Normal breath sounds. No stridor. No wheezing or rales. Chest:      Chest wall: No tenderness. Abdominal:      General: Bowel sounds are normal. There is no distension. Palpations: Abdomen is soft. There is no mass. Tenderness: There is no abdominal tenderness. There is no guarding or rebound. Genitourinary:     Vagina: Normal. No vaginal discharge. Musculoskeletal: Normal range of motion. General: No swelling, tenderness or deformity. Right lower leg: No edema. Left lower leg: No edema. Lymphadenopathy:      Cervical: No cervical adenopathy. Skin:     General: Skin is warm and dry. Capillary Refill: Capillary refill takes less than 2 seconds. Coloration: Skin is not pale. Findings: No erythema or rash.       Comments: Skin tear left lateral wrist   Neurological: General: No focal deficit present. Mental Status: She is alert and oriented to person, place, and time. Mental status is at baseline. Cranial Nerves: No cranial nerve deficit. Sensory: No sensory deficit. Deep Tendon Reflexes: Reflexes are normal and symmetric. Psychiatric:         Mood and Affect: Mood normal.         Behavior: Behavior normal.         Thought Content: Thought content normal.         Judgment: Judgment normal.          MDM  Number of Diagnoses or Management Options     Amount and/or Complexity of Data Reviewed  Clinical lab tests: reviewed and ordered  Tests in the radiology section of CPT®: ordered and reviewed  Tests in the medicine section of CPT®: ordered and reviewed  Review and summarize past medical records: yes  Independent visualization of images, tracings, or specimens: yes    Risk of Complications, Morbidity, and/or Mortality  Presenting problems: high  Diagnostic procedures: high  Management options: high    Patient Progress  Patient progress: stable         Wound Repair    Date/Time: 1/27/2021 12:59 AM  Performed by: attendingPreparation: skin prepped with Shur-Clens and sterile field established  Pre-procedure re-eval: Immediately prior to the procedure, the patient was reevaluated and found suitable for the planned procedure and any planned medications. Location details: left wrist  Wound length:2.5 cm or less (2 cm)  Foreign bodies: no foreign bodies  Irrigation solution: saline  Debridement: none  Skin closure: glue and Steri-Strips  Approximation: close  Patient tolerance: patient tolerated the procedure well with no immediate complications  My total time at bedside, performing this procedure was 1-15 minutes.         Orders Placed This Encounter    XR CHEST PORT     Standing Status:   Standing     Number of Occurrences:   1     Order Specific Question:   Reason for Exam     Answer:   syncope    CT HEAD WO CONT     Standing Status:   Standing     Number of Occurrences:   1     Order Specific Question:   Transport     Answer:   Stretcher [5]     Order Specific Question:   Reason for Exam     Answer:   syncope     Order Specific Question:   Decision Support Exception     Answer:   Emergency Medical Condition (MA) [1]    COMPREHENSIVE METABOLIC PANEL     Standing Status:   Standing     Number of Occurrences:   1    CARDIAC PANEL,(CK, CKMB & TROPONIN)     Standing Status:   Standing     Number of Occurrences:   1    URINALYSIS W/ RFLX MICROSCOPIC     Standing Status:   Standing     Number of Occurrences:   1    CBC WITH AUTOMATED DIFF     Standing Status:   Standing     Number of Occurrences:   1    TSH 3RD GENERATION     Standing Status:   Standing     Number of Occurrences:   1    URINE MICROSCOPIC ONLY     Standing Status:   Standing     Number of Occurrences:   1    EKG, 12 LEAD, INITIAL     Standing Status:   Standing     Number of Occurrences:   1     Order Specific Question:   Reason for Exam:     Answer:   syncope    SALINE LOCK IV ONE TIME STAT     Standing Status:   Standing     Number of Occurrences:   1    INITIAL PHYSICIAN ORDER: OBSERVATION/OUTPATIENT IN A BED OBSERVATION; Telemetry; syncope     Standing Status:   Standing     Number of Occurrences:   1     Order Specific Question:   Patient Class:      Answer:   OBSERVATION [104]     Order Specific Question:   Type of Bed     Answer:   Telemetry [19]     Order Specific Question:   Reason for Observation     Answer:   syncope     Order Specific Question:   Admitting Diagnosis     Answer:   Syncope [[id number]]     Order Specific Question:   Admitting Physician     Answer:   Arie Malagon [[id number]]     Order Specific Question:   Attending Physician     Answer:   Arie Malagon [[id number]]     Recent Results (from the past 12 hour(s))   EKG, 12 LEAD, INITIAL    Collection Time: 01/26/21 10:09 PM   Result Value Ref Range    Ventricular Rate 52 BPM    Atrial Rate 52 BPM    P-R Interval 152 ms    QRS Duration 150 ms    Q-T Interval 500 ms    QTC Calculation (Bezet) 465 ms    Calculated P Axis 59 degrees    Calculated R Axis 15 degrees    Calculated T Axis -162 degrees    Diagnosis       Sinus bradycardia  Left bundle branch block  Abnormal ECG  When compared with ECG of 18-MAY-2014 19:31,  No significant change was found     METABOLIC PANEL, COMPREHENSIVE    Collection Time: 01/26/21 10:36 PM   Result Value Ref Range    Sodium 139 136 - 145 mmol/L    Potassium 3.8 3.5 - 5.5 mmol/L    Chloride 105 100 - 111 mmol/L    CO2 31 21 - 32 mmol/L    Anion gap 3 3.0 - 18 mmol/L    Glucose 143 (H) 74 - 99 mg/dL    BUN 24 (H) 7.0 - 18 MG/DL    Creatinine 1.51 (H) 0.6 - 1.3 MG/DL    BUN/Creatinine ratio 16 12 - 20      GFR est AA 39 (L) >60 ml/min/1.73m2    GFR est non-AA 32 (L) >60 ml/min/1.73m2    Calcium 9.2 8.5 - 10.1 MG/DL    Bilirubin, total 0.5 0.2 - 1.0 MG/DL    ALT (SGPT) 15 13 - 56 U/L    AST (SGOT) 21 10 - 38 U/L    Alk.  phosphatase 120 (H) 45 - 117 U/L    Protein, total 8.1 6.4 - 8.2 g/dL    Albumin 3.5 3.4 - 5.0 g/dL    Globulin 4.6 (H) 2.0 - 4.0 g/dL    A-G Ratio 0.8 0.8 - 1.7     CARDIAC PANEL,(CK, CKMB & TROPONIN)    Collection Time: 01/26/21 10:36 PM   Result Value Ref Range    CK - MB 1.3 <3.6 ng/ml    CK-MB Index 1.0 0.0 - 4.0 %     26 - 192 U/L    Troponin-I, QT 0.03 0.0 - 0.045 NG/ML   URINALYSIS W/ RFLX MICROSCOPIC    Collection Time: 01/26/21 11:20 PM   Result Value Ref Range    Color YELLOW      Appearance CLOUDY      Specific gravity 1.026 1.005 - 1.030      pH (UA) 5.0 5.0 - 8.0      Protein 30 (A) NEG mg/dL    Glucose Negative NEG mg/dL    Ketone TRACE (A) NEG mg/dL    Bilirubin Negative NEG      Blood Negative NEG      Urobilinogen 1.0 0.2 - 1.0 EU/dL    Nitrites Positive (A) NEG      Leukocyte Esterase SMALL (A) NEG     CBC WITH AUTOMATED DIFF    Collection Time: 01/26/21 11:25 PM   Result Value Ref Range    WBC 7.6 4.6 - 13.2 K/uL    RBC 4.24 4.20 - 5.30 M/uL    HGB 13.0 12.0 - 16.0 g/dL    HCT 41.0 35.0 - 45.0 %    MCV 96.7 74.0 - 97.0 FL    MCH 30.7 24.0 - 34.0 PG    MCHC 31.7 31.0 - 37.0 g/dL    RDW 13.7 11.6 - 14.5 %    PLATELET 705 326 - 846 K/uL    MPV 10.2 9.2 - 11.8 FL    NEUTROPHILS 71 40 - 73 %    LYMPHOCYTES 16 (L) 21 - 52 %    MONOCYTES 12 (H) 3 - 10 %    EOSINOPHILS 1 0 - 5 %    BASOPHILS 0 0 - 2 %    ABS. NEUTROPHILS 5.4 1.8 - 8.0 K/UL    ABS. LYMPHOCYTES 1.2 0.9 - 3.6 K/UL    ABS. MONOCYTES 0.9 0.05 - 1.2 K/UL    ABS. EOSINOPHILS 0.0 0.0 - 0.4 K/UL    ABS. BASOPHILS 0.0 0.0 - 0.1 K/UL    DF AUTOMATED       XR CHEST PORT    (Results Pending) - no acute process   CT HEAD WO CONT    (Results Pending) -preliminary interpretation by radiology resident - No intracranial hemorrhage, acute stroke, midline shift or mass effect. Moderate burden of presumed chronic microvascular disease mildly progressed since 2014. Lacunar infarct right basal ganglia, unchanged. Mild volume loss. 2:40 AM -I spoke with the patient's daughter and grandson who reports that the patient experienced a similar syncopal episode approximately 1 month ago. Patient's grandson states that she was significantly confused after she passed out. Remy Castillo MD discussed care with Dr. Giselle Bojorquez. Standard discussion; including history of patients chief complaint, available diagnostic results, and treatment course. Diagnosis:   1. Syncope, unspecified syncope type    2. Laceration of left wrist, initial encounter          Disposition: Admitted    Follow-up Information    None         Patient's Medications   Start Taking    No medications on file   Continue Taking    DONEPEZIL (ARICEPT) 10 MG TABLET        ERGOCALCIFEROL (VITAMIN D2) 50,000 UNIT CAPSULE    Take 50,000 Units by mouth every seven (7) days. GLIMEPIRIDE (AMARYL) 4 MG TABLET    Take 4 mg by mouth every morning.     LEVOTHYROXINE (SYNTHROID) 112 MCG TABLET        LORAZEPAM (ATIVAN) 0.5 MG TABLET    Take 0.5 mg by mouth two (2) times daily as needed. LOSARTAN-HYDROCHLOROTHIAZIDE (HYZAAR) 50-12.5 MG PER TABLET        MEMANTINE (NAMENDA) 5 MG TABLET    Take 5 mg by mouth. OXYBUTYNIN (DITROPAN) 5 MG TABLET    Take 5 mg by mouth three (3) times daily. RANITIDINE (ZANTAC) 150 MG TABLET    Take 150 mg by mouth as needed. SIMVASTATIN (ZOCOR) 5 MG TABLET    Take 5 mg by mouth nightly.    These Medications have changed    No medications on file   Stop Taking    No medications on file

## 2021-01-27 NOTE — PROGRESS NOTES
2236: Arrived from ED via bed brought by ED RN' sleeping; opens eyes with verbal commands; confused; impulsive; kept comfortable in bed; gown changed; admission assessment done;v/s checked; telemonitor applied reading SB BBB; IVF initiated; bed alarms on    0545: sleeping; with regular, nonlabored breathing; bed alarms on    0640: kept comfortable; pad dry    0720: sleeping; no apparent distress noted    Bedside and Verbal shift change report given to Godfrey Ugalde and DANIEL Lara RN (oncoming nurse) by Chloe So RN (offgoing nurse). Report included the following information SBAR, Kardex, Intake/Output, Recent Results and Cardiac Rhythm Sinus Bradycardia BBB.

## 2021-01-27 NOTE — PROGRESS NOTES
5541- Bedside and Verbal shift change report given to CRISTOFER Garcia (oncoming nurse) by Cyrus Farrell RN (offgoing nurse). Report included the following information SBAR, Kardex, ED Summary, [facility] and Recent Results. 1224- AM meds given. 1120- Reassessment, no changes from previous assessment. 1630- Insulin held, Pt blood glucose within normal limits    1840- Called physician (Dr. Artie Ramírez) about pt, HR dropping in the 30s-40s. Pt is asymptomatic, vitals taken and reported over the phone. Stated if pt becomes symptomatic, inform the night physician and see further orders from the physician (Dr. Artie Ramírez). 1940- Bedside and Verbal shift change report given to CRISTOFER Fuller (oncoming nurse) by Charlotte Carballo RN (offgoing nurse). Report included the following information SBAR, Kardex, ED Summary, [facility] and Recent Results.

## 2021-01-28 PROBLEM — R00.1 BRADYCARDIA: Status: ACTIVE | Noted: 2021-01-28

## 2021-01-28 LAB
ANION GAP SERPL CALC-SCNC: 4 MMOL/L (ref 3–18)
BASOPHILS # BLD: 0 K/UL (ref 0–0.1)
BASOPHILS NFR BLD: 0 % (ref 0–2)
BUN SERPL-MCNC: 19 MG/DL (ref 7–18)
BUN/CREAT SERPL: 24 (ref 12–20)
CALCIUM SERPL-MCNC: 8.4 MG/DL (ref 8.5–10.1)
CHLORIDE SERPL-SCNC: 107 MMOL/L (ref 100–111)
CO2 SERPL-SCNC: 30 MMOL/L (ref 21–32)
CREAT SERPL-MCNC: 0.78 MG/DL (ref 0.6–1.3)
DIFFERENTIAL METHOD BLD: ABNORMAL
EOSINOPHIL # BLD: 0.1 K/UL (ref 0–0.4)
EOSINOPHIL NFR BLD: 2 % (ref 0–5)
ERYTHROCYTE [DISTWIDTH] IN BLOOD BY AUTOMATED COUNT: 13.6 % (ref 11.6–14.5)
GLUCOSE BLD STRIP.AUTO-MCNC: 78 MG/DL (ref 70–110)
GLUCOSE BLD STRIP.AUTO-MCNC: 79 MG/DL (ref 70–110)
GLUCOSE BLD STRIP.AUTO-MCNC: 84 MG/DL (ref 70–110)
GLUCOSE BLD STRIP.AUTO-MCNC: 96 MG/DL (ref 70–110)
GLUCOSE SERPL-MCNC: 86 MG/DL (ref 74–99)
HCT VFR BLD AUTO: 37.1 % (ref 35–45)
HGB BLD-MCNC: 11.8 G/DL (ref 12–16)
LYMPHOCYTES # BLD: 2.2 K/UL (ref 0.9–3.6)
LYMPHOCYTES NFR BLD: 36 % (ref 21–52)
MAGNESIUM SERPL-MCNC: 2.2 MG/DL (ref 1.6–2.6)
MCH RBC QN AUTO: 30.3 PG (ref 24–34)
MCHC RBC AUTO-ENTMCNC: 31.8 G/DL (ref 31–37)
MCV RBC AUTO: 95.1 FL (ref 74–97)
MONOCYTES # BLD: 1 K/UL (ref 0.05–1.2)
MONOCYTES NFR BLD: 16 % (ref 3–10)
NEUTS SEG # BLD: 2.8 K/UL (ref 1.8–8)
NEUTS SEG NFR BLD: 46 % (ref 40–73)
PHOSPHATE SERPL-MCNC: 2.8 MG/DL (ref 2.5–4.9)
PLATELET # BLD AUTO: 221 K/UL (ref 135–420)
PMV BLD AUTO: 10.8 FL (ref 9.2–11.8)
POTASSIUM SERPL-SCNC: 3.8 MMOL/L (ref 3.5–5.5)
RBC # BLD AUTO: 3.9 M/UL (ref 4.2–5.3)
SODIUM SERPL-SCNC: 141 MMOL/L (ref 136–145)
WBC # BLD AUTO: 6.2 K/UL (ref 4.6–13.2)

## 2021-01-28 PROCEDURE — 65660000000 HC RM CCU STEPDOWN

## 2021-01-28 PROCEDURE — 83735 ASSAY OF MAGNESIUM: CPT

## 2021-01-28 PROCEDURE — 82962 GLUCOSE BLOOD TEST: CPT

## 2021-01-28 PROCEDURE — 99218 HC RM OBSERVATION: CPT

## 2021-01-28 PROCEDURE — 74011250637 HC RX REV CODE- 250/637: Performed by: HOSPITALIST

## 2021-01-28 PROCEDURE — 93005 ELECTROCARDIOGRAM TRACING: CPT

## 2021-01-28 PROCEDURE — 85025 COMPLETE CBC W/AUTO DIFF WBC: CPT

## 2021-01-28 PROCEDURE — 74011250636 HC RX REV CODE- 250/636: Performed by: HOSPITALIST

## 2021-01-28 PROCEDURE — 84100 ASSAY OF PHOSPHORUS: CPT

## 2021-01-28 PROCEDURE — 74011000258 HC RX REV CODE- 258: Performed by: INTERNAL MEDICINE

## 2021-01-28 PROCEDURE — 74011250636 HC RX REV CODE- 250/636: Performed by: INTERNAL MEDICINE

## 2021-01-28 PROCEDURE — 96376 TX/PRO/DX INJ SAME DRUG ADON: CPT

## 2021-01-28 PROCEDURE — 96372 THER/PROPH/DIAG INJ SC/IM: CPT

## 2021-01-28 PROCEDURE — 99223 1ST HOSP IP/OBS HIGH 75: CPT | Performed by: INTERNAL MEDICINE

## 2021-01-28 PROCEDURE — 80048 BASIC METABOLIC PNL TOTAL CA: CPT

## 2021-01-28 PROCEDURE — 2709999900 HC NON-CHARGEABLE SUPPLY

## 2021-01-28 PROCEDURE — 36415 COLL VENOUS BLD VENIPUNCTURE: CPT

## 2021-01-28 RX ORDER — ENOXAPARIN SODIUM 100 MG/ML
40 INJECTION SUBCUTANEOUS DAILY
Status: DISCONTINUED | OUTPATIENT
Start: 2021-01-29 | End: 2021-01-31 | Stop reason: HOSPADM

## 2021-01-28 RX ADMIN — MEMANTINE 5 MG: 5 TABLET ORAL at 08:13

## 2021-01-28 RX ADMIN — Medication 10 ML: at 06:27

## 2021-01-28 RX ADMIN — LEVOTHYROXINE SODIUM 100 MCG: 0.03 TABLET ORAL at 08:13

## 2021-01-28 RX ADMIN — Medication 10 ML: at 14:13

## 2021-01-28 RX ADMIN — SODIUM CHLORIDE 75 ML/HR: 900 INJECTION, SOLUTION INTRAVENOUS at 06:45

## 2021-01-28 RX ADMIN — DONEPEZIL HYDROCHLORIDE 5 MG: 5 TABLET, FILM COATED ORAL at 08:13

## 2021-01-28 RX ADMIN — ENOXAPARIN SODIUM 30 MG: 30 INJECTION SUBCUTANEOUS at 08:12

## 2021-01-28 RX ADMIN — ATORVASTATIN CALCIUM 10 MG: 10 TABLET, FILM COATED ORAL at 22:30

## 2021-01-28 RX ADMIN — CEFTRIAXONE 1 G: 1 INJECTION, POWDER, FOR SOLUTION INTRAMUSCULAR; INTRAVENOUS at 08:13

## 2021-01-28 NOTE — PROGRESS NOTES
Problem: Falls - Risk of  Goal: *Absence of Falls  Description: Document Anthony Pierce Fall Risk and appropriate interventions in the flowsheet. Outcome: Progressing Towards Goal  Note: Fall Risk Interventions:  Mobility Interventions: Bed/chair exit alarm, Patient to call before getting OOB, Strengthening exercises (ROM-active/passive), Utilize walker, cane, or other assistive device    Mentation Interventions: Bed/chair exit alarm, Door open when patient unattended, Increase mobility, More frequent rounding, Reorient patient, Room close to nurse's station    Medication Interventions: Bed/chair exit alarm, Evaluate medications/consider consulting pharmacy, Patient to call before getting OOB    Elimination Interventions: Bed/chair exit alarm, Call light in reach, Patient to call for help with toileting needs, Toilet paper/wipes in reach, Toileting schedule/hourly rounds              Problem: Patient Education: Go to Patient Education Activity  Goal: Patient/Family Education  Outcome: Progressing Towards Goal     Problem: Pressure Injury - Risk of  Goal: *Prevention of pressure injury  Description: Document Fabian Scale and appropriate interventions in the flowsheet.   Outcome: Progressing Towards Goal  Note: Pressure Injury Interventions:  Sensory Interventions: Assess changes in LOC, Check visual cues for pain, Keep linens dry and wrinkle-free, Pad between skin to skin, Pressure redistribution bed/mattress (bed type)         Activity Interventions: Pressure redistribution bed/mattress(bed type)    Mobility Interventions: HOB 30 degrees or less, Pressure redistribution bed/mattress (bed type)    Nutrition Interventions: Document food/fluid/supplement intake, Offer support with meals,snacks and hydration                     Problem: Pain  Goal: *Control of Pain  Outcome: Progressing Towards Goal     Problem: Patient Education: Go to Patient Education Activity  Goal: Patient/Family Education  Outcome: Progressing Towards Goal

## 2021-01-28 NOTE — PROGRESS NOTES
Bedside shift change report given to Caitlyn RN (oncoming nurse) by Jacques Kumar RN (offgoing nurse). Report included the following information SBAR, Kardex, Intake/Output, MAR and Recent Results. 8977 -- Call from Tele patient lucia 40, nurse to the room patient asymptomatic, CNA present. 0813 -- AM medications given, well tolerated, no insulin needed. 18 -- Spoke with patient's daughter Noa Gloria [phone], wants Dr. Underwood Knows to call her because she more questions about the pacemaker insertion.     1703 -- Patient pulled out IV, won' let nurse put another back in.    [facility]

## 2021-01-28 NOTE — DIABETES MGMT
Initial Nutrition Assessment and Glycemic Control Plan of Care    Type and Reason for Visit: Initial  Nutrition Recommendations/Plan:   1. Will try Ensure BID  2. Monitor po intake, labs, weights. Nutrition Assessment:     Pt with BMI in overweight range and appears well nourished. Noted good po intake at lunch today however poor po intake per I/O's yesterday. Malnutrition Assessment:  Malnutrition Status:  No malnutrition    Nutrition History and Allergies: NKFA's. Diabetes Management:   Good glycemic control both PTA and at this time. Has not required any corrective lispro. Recent blood glucose:     1/28:  78 to 84 mg/dL  Within target range (non-ICU: <140; ICU<180):  Yes       Current Insulin regimen: corrective lispro, normal insulin sensitivity ACHS  Home medication/insulin regimen: none listed  HbA1c: 5.7%  equivalent  to ave Blood Glucose of 111  mg/dl for 2-3 months prior to admission   Adequate glycemic control PTA:   Yes      Estimated Daily Nutrient Needs:  Energy (kcal): 1367; Weight Used for Energy Requirements: Current  Protein (g): 78; Weight Used for Protein Requirements: Current  Fluid (ml/day): 1500; Method Used for Fluid Requirements: ml/kg    Nutrition Related Findings: Pt admitted with syncope, UTI. PMhx includes T2DM, memory loss. Pt is very [place] and was not able to provide diet/weight history. She states her usual weight is 150 lbs. IVF:  NS at 75 ml/hr       Wounds:    None     Current Nutrition Therapies:  DIET DIABETIC CONSISTENT CARB Regular. Meal intake:   Patient Vitals for the past 168 hrs:   % Diet Eaten   01/27/21 1300 0 %   01/27/21 0827 0 %     Additional Caloric Sources: none     Anthropometric Measures:  · Height:  5' (152.4 cm)  · Current Body Wt:  59.4 kg (131 lb)(1/28/21)       · BMI Category: Overweight (BMI 25.0-29.9)     .   Wt Readings from Last 3 Encounters:   01/28/21 59.4 kg (131 lb)   11/29/19 67.9 kg (149 lb 11.2 oz)   03/21/16 63.4 kg (139 lb 12.8 oz) Nutrition Diagnosis:   No nutrition diagnosis at this time   Nutrition Interventions:   Food and/or Nutrient Delivery: Continue current diet, Start oral nutrition supplement  Nutrition Education and Counseling: Education not indicated  Coordination of Nutrition Care: Continue to monitor while inpatient  Goals:  PO intake will meet >75% of patient estimated nutritional needs within the next 7 days. Blood glucose will be in target range (70 to 180 mg/dL) within 3 days.         Nutrition Monitoring and Evaluation:   Behavioral-Environmental Outcomes: None identified  Food/Nutrient Intake Outcomes: Food and nutrient intake, Supplement intake  Physical Signs/Symptoms Outcomes: Biochemical data, Weight, Meal time behavior  Discharge Planning:    No discharge needs at this time   Electronically signed by Saskia Warner RD on 1/28/2021 at 4:36 PM  Contact:   Saskia Warner, [address] [place] [address], [facility]   Office:  [address] [place] [address] Pager:  [phone]
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
## 2021-01-28 NOTE — PROGRESS NOTES
Hospitalist Progress Note             Date of Service:  2021  NAME:  Elvira Santacruz  :  10/18/1925  MRN:  [id number]    Assessment   Principal Problem:    Syncope (2014)     Active Problems:    DM II (diabetes mellitus, type II), controlled ([facility] [address] (2014)              Plan      Syncope              - having significant bradycardia into 30s, may be related to syncope, also uti could be contributing,    - treat uti as below   - avoid blocking agents   - discussed need for pacer with both family and cardiology   - echo poor study, ef 40-45%, moderate TR, moderate pulm htn- at this age and functional status nothing addressible beside medical therapy     DM              - SSI    UTI  - ceftriaxone, dc on keflex    Hypothyroidism  - will make small increase in dose, as tsh remains elevated.     Hold any HTN meds        Hospital Problems  Date Reviewed: 9/10/2013          Codes Class Noted POA    * (Principal) Syncope ICD-10-CM: R55  ICD-9-CM: 780.2  2014 Unknown        DM II (diabetes mellitus, type II), controlled ([facility] ICD-10-CM: E11.9  ICD-9-CM: 250.00  2014 Yes                Review of Systems:   A comprehensive review of systems was negative except for that written in the HPI. Vital Signs:    Last 24hrs VS reviewed since prior progress note. Most recent are:  Visit Vitals  BP (!) 168/59   Pulse (!) 43   Temp 97.8 °F (36.6 °C)   Resp 18   Ht 5' (1.524 m)   Wt 56.2 kg (124 lb)   SpO2 98%   BMI 24.22 kg/m²         Intake/Output Summary (Last 24 hours) at 2021 1125  Last data filed at 2021 2200  Gross per 24 hour   Intake 240 ml   Output    Net 240 ml        Physical Examination:             General:          Alert, cooperative, no distress, appears stated age.      HEENT:           Atraumatic, anicteric sclerae, pink conjunctivae                          No oral ulcers, mucosa moist, throat clear, dentition fair  Neck:               Supple, symmetrical  Lungs:             Clear to auscultation bilaterally. No Wheezing or Rhonchi. No rales. Chest wall:      No tenderness  No Accessory muscle use. Heart:              Regular  lucia  Abdomen:        Soft, non-tender. Not distended. Bowel sounds normal  Extremities:     No cyanosis. No clubbing,                            Skin turgor normal, Capillary refill normal  Skin:                Not pale. Not Jaundiced  No rashes   Psych:             Not anxious or agitated. Neurologic:      Alert, moves all extremities, oriented to person       Data Review:    Review and/or order of clinical lab test  Review and/or order of tests in the radiology section of CPT  Review and/or order of tests in the medicine section of CPT      Labs:     Recent Labs     01/28/21  0245 01/26/21  2325   WBC 6.2 7.6   HGB 11.8* 13.0   HCT 37.1 41.0    281     Recent Labs     01/28/21  0245 01/26/21 2236    139   K 3.8 3.8    105   CO2 30 31   BUN 19* 24*   CREA 0.78 1.51*   GLU 86 143*   CA 8.4* 9.2   MG 2.2  --    PHOS 2.8  --      Recent Labs     01/26/21 2236   ALT 15   *   TBILI 0.5   TP 8.1   ALB 3.5   GLOB 4.6*     No results for input(s): INR, PTP, APTT, INREXT in the last 72 hours. No results for input(s): FE, TIBC, PSAT, FERR in the last 72 hours. No results found for: FOL, RBCF   No results for input(s): PH, PCO2, PO2 in the last 72 hours.   Recent Labs     01/26/21 2236      TROIQ 0.03     Lab Results   Component Value Date/Time    Cholesterol, total 140 05/20/2014 05:03 AM    HDL Cholesterol 76 (H) 05/20/2014 05:03 AM    LDL, calculated 51.6 05/20/2014 05:03 AM    Triglyceride 62 05/20/2014 05:03 AM    CHOL/HDL Ratio 1.8 05/20/2014 05:03 AM     Lab Results   Component Value Date/Time    Glucose (POC) 79 01/28/2021 07:40 AM    Glucose (POC) 87 01/27/2021 08:54 PM    Glucose (POC) 87 01/27/2021 04:17 PM    Glucose (POC) 107 01/27/2021 11:24 AM    Glucose (POC) 122 (H) 01/27/2021 07:14 AM     Lab Results   Component Value Date/Time    Color YELLOW 01/26/2021 11:20 PM    Appearance CLOUDY 01/26/2021 11:20 PM    Specific gravity 1.026 01/26/2021 11:20 PM    pH (UA) 5.0 01/26/2021 11:20 PM    Protein 30 (A) 01/26/2021 11:20 PM    Glucose Negative 01/26/2021 11:20 PM    Ketone TRACE (A) 01/26/2021 11:20 PM    Bilirubin Negative 01/26/2021 11:20 PM    Urobilinogen 1.0 01/26/2021 11:20 PM    Nitrites Positive (A) 01/26/2021 11:20 PM    Leukocyte Esterase SMALL (A) 01/26/2021 11:20 PM    Epithelial cells FEW 01/26/2021 11:20 PM    Bacteria 2+ (A) 01/26/2021 11:20 PM    WBC 3 to 5 01/26/2021 11:20 PM    RBC 0 to 2 01/26/2021 11:20 PM         Medications Reviewed:     Current Facility-Administered Medications   Medication Dose Route Frequency    donepeziL (ARICEPT) tablet 5 mg  5 mg Oral DAILY    levothyroxine (SYNTHROID) tablet 100 mcg  100 mcg Oral ACB    memantine (NAMENDA) tablet 5 mg  5 mg Oral DAILY    atorvastatin (LIPITOR) tablet 10 mg  10 mg Oral QHS    sodium chloride (NS) flush 5-40 mL  5-40 mL IntraVENous Q8H    sodium chloride (NS) flush 5-40 mL  5-40 mL IntraVENous PRN    acetaminophen (TYLENOL) tablet 650 mg  650 mg Oral Q6H PRN    Or    acetaminophen (TYLENOL) suppository 650 mg  650 mg Rectal Q6H PRN    polyethylene glycol (MIRALAX) packet 17 g  17 g Oral DAILY PRN    promethazine (PHENERGAN) tablet 12.5 mg  12.5 mg Oral Q6H PRN    Or    ondansetron (ZOFRAN) injection 4 mg  4 mg IntraVENous Q6H PRN    enoxaparin (LOVENOX) injection 30 mg  30 mg SubCUTAneous DAILY    0.9% sodium chloride infusion  75 mL/hr IntraVENous CONTINUOUS    insulin lispro (HUMALOG) injection   SubCUTAneous AC&HS    glucose chewable tablet 16 g  4 Tab Oral PRN    glucagon (GLUCAGEN) injection 1 mg  1 mg IntraMUSCular PRN    dextrose (D50) infusion 12.5-25 g  25-50 mL IntraVENous PRN    cefTRIAXone (ROCEPHIN) 1 g in 0.9% sodium chloride (MBP/ADV) 50 mL MBP  1 g IntraVENous Q24H     ______________________________________________________________________  EXPECTED LENGTH OF STAY: - - -  ACTUAL LENGTH OF STAY:          0                 Carollee Rinne, MD

## 2021-01-28 NOTE — ROUTINE PROCESS
1945: Assumed care. Awake. Confused. No discomfort noted at this time. No SOB on RA. Call light within reach. Bed alarm on. 
 
2030: Patient is uncooperative & combative. Pulled out newly placed PIV line. 2112: Talked to patient. Explanations provided. Patient is more cooperative & calm. Due med given. BS 87. HS snack provided. 2300: NO change from previous assessment. 0200: Sleeping.  
 
0300: Patient uncooperative. Not able to get full v/s.  
 
0640: Slept good thru night. Needs attended. Incontinence care provided. 1078: v/s checked. Lowest HR 37 noted thru night. 4168: Bedside and Verbal shift change report given to Caitlyn RN (oncoming nurse) by me (offgoing nurse).  Report included the following information SBAR, Kardex, Intake/Output, MAR, Recent Results and Cardiac Rhythm SB.

## 2021-01-29 LAB
ANION GAP SERPL CALC-SCNC: 3 MMOL/L (ref 3–18)
ATRIAL RATE: 42 BPM
BASOPHILS # BLD: 0 K/UL (ref 0–0.1)
BASOPHILS NFR BLD: 0 % (ref 0–2)
BUN SERPL-MCNC: 19 MG/DL (ref 7–18)
BUN/CREAT SERPL: 22 (ref 12–20)
CALCIUM SERPL-MCNC: 8.3 MG/DL (ref 8.5–10.1)
CALCULATED P AXIS, ECG09: 67 DEGREES
CALCULATED R AXIS, ECG10: 34 DEGREES
CALCULATED T AXIS, ECG11: 159 DEGREES
CHLORIDE SERPL-SCNC: 108 MMOL/L (ref 100–111)
CO2 SERPL-SCNC: 29 MMOL/L (ref 21–32)
CREAT SERPL-MCNC: 0.88 MG/DL (ref 0.6–1.3)
DIAGNOSIS, 93000: NORMAL
DIFFERENTIAL METHOD BLD: ABNORMAL
EOSINOPHIL # BLD: 0.1 K/UL (ref 0–0.4)
EOSINOPHIL NFR BLD: 2 % (ref 0–5)
ERYTHROCYTE [DISTWIDTH] IN BLOOD BY AUTOMATED COUNT: 13.4 % (ref 11.6–14.5)
GLUCOSE BLD STRIP.AUTO-MCNC: 117 MG/DL (ref 70–110)
GLUCOSE BLD STRIP.AUTO-MCNC: 151 MG/DL (ref 70–110)
GLUCOSE BLD STRIP.AUTO-MCNC: 158 MG/DL (ref 70–110)
GLUCOSE BLD STRIP.AUTO-MCNC: 88 MG/DL (ref 70–110)
GLUCOSE SERPL-MCNC: 94 MG/DL (ref 74–99)
HCT VFR BLD AUTO: 37.6 % (ref 35–45)
HGB BLD-MCNC: 11.8 G/DL (ref 12–16)
LYMPHOCYTES # BLD: 2 K/UL (ref 0.9–3.6)
LYMPHOCYTES NFR BLD: 33 % (ref 21–52)
MAGNESIUM SERPL-MCNC: 2.1 MG/DL (ref 1.6–2.6)
MCH RBC QN AUTO: 29.7 PG (ref 24–34)
MCHC RBC AUTO-ENTMCNC: 31.4 G/DL (ref 31–37)
MCV RBC AUTO: 94.7 FL (ref 74–97)
MONOCYTES # BLD: 0.8 K/UL (ref 0.05–1.2)
MONOCYTES NFR BLD: 12 % (ref 3–10)
NEUTS SEG # BLD: 3.2 K/UL (ref 1.8–8)
NEUTS SEG NFR BLD: 53 % (ref 40–73)
P-R INTERVAL, ECG05: 174 MS
PHOSPHATE SERPL-MCNC: 2.3 MG/DL (ref 2.5–4.9)
PLATELET # BLD AUTO: 267 K/UL (ref 135–420)
PMV BLD AUTO: 10.3 FL (ref 9.2–11.8)
POTASSIUM SERPL-SCNC: 4 MMOL/L (ref 3.5–5.5)
Q-T INTERVAL, ECG07: 556 MS
QRS DURATION, ECG06: 150 MS
QTC CALCULATION (BEZET), ECG08: 464 MS
RBC # BLD AUTO: 3.97 M/UL (ref 4.2–5.3)
SODIUM SERPL-SCNC: 140 MMOL/L (ref 136–145)
VENTRICULAR RATE, ECG03: 42 BPM
WBC # BLD AUTO: 6.1 K/UL (ref 4.6–13.2)

## 2021-01-29 PROCEDURE — 84100 ASSAY OF PHOSPHORUS: CPT

## 2021-01-29 PROCEDURE — 65660000000 HC RM CCU STEPDOWN

## 2021-01-29 PROCEDURE — 36415 COLL VENOUS BLD VENIPUNCTURE: CPT

## 2021-01-29 PROCEDURE — 82962 GLUCOSE BLOOD TEST: CPT

## 2021-01-29 PROCEDURE — 2709999900 HC NON-CHARGEABLE SUPPLY

## 2021-01-29 PROCEDURE — 99232 SBSQ HOSP IP/OBS MODERATE 35: CPT | Performed by: INTERNAL MEDICINE

## 2021-01-29 PROCEDURE — 85025 COMPLETE CBC W/AUTO DIFF WBC: CPT

## 2021-01-29 PROCEDURE — 74011250637 HC RX REV CODE- 250/637: Performed by: HOSPITALIST

## 2021-01-29 PROCEDURE — 74011250636 HC RX REV CODE- 250/636: Performed by: HOSPITALIST

## 2021-01-29 PROCEDURE — 74011636637 HC RX REV CODE- 636/637: Performed by: HOSPITALIST

## 2021-01-29 PROCEDURE — 74011000258 HC RX REV CODE- 258: Performed by: INTERNAL MEDICINE

## 2021-01-29 PROCEDURE — 74011250637 HC RX REV CODE- 250/637: Performed by: INTERNAL MEDICINE

## 2021-01-29 PROCEDURE — 74011250636 HC RX REV CODE- 250/636: Performed by: INTERNAL MEDICINE

## 2021-01-29 PROCEDURE — 80048 BASIC METABOLIC PNL TOTAL CA: CPT

## 2021-01-29 PROCEDURE — 83735 ASSAY OF MAGNESIUM: CPT

## 2021-01-29 RX ADMIN — INSULIN LISPRO 2 UNITS: 100 INJECTION, SOLUTION INTRAVENOUS; SUBCUTANEOUS at 11:30

## 2021-01-29 RX ADMIN — ATORVASTATIN CALCIUM 10 MG: 10 TABLET, FILM COATED ORAL at 21:59

## 2021-01-29 RX ADMIN — Medication 10 ML: at 13:16

## 2021-01-29 RX ADMIN — Medication 10 ML: at 06:25

## 2021-01-29 RX ADMIN — DONEPEZIL HYDROCHLORIDE 5 MG: 5 TABLET, FILM COATED ORAL at 10:03

## 2021-01-29 RX ADMIN — Medication 10 ML: at 21:59

## 2021-01-29 RX ADMIN — MEMANTINE 5 MG: 5 TABLET ORAL at 10:02

## 2021-01-29 RX ADMIN — ENOXAPARIN SODIUM 40 MG: 40 INJECTION SUBCUTANEOUS at 10:03

## 2021-01-29 RX ADMIN — SODIUM CHLORIDE 75 ML/HR: 900 INJECTION, SOLUTION INTRAVENOUS at 04:00

## 2021-01-29 RX ADMIN — LEVOTHYROXINE SODIUM 125 MCG: 0.03 TABLET ORAL at 06:24

## 2021-01-29 RX ADMIN — INSULIN LISPRO 2 UNITS: 100 INJECTION, SOLUTION INTRAVENOUS; SUBCUTANEOUS at 22:00

## 2021-01-29 RX ADMIN — Medication 10 ML: at 00:04

## 2021-01-29 RX ADMIN — CEFTRIAXONE 1 G: 1 INJECTION, POWDER, FOR SOLUTION INTRAMUSCULAR; INTRAVENOUS at 10:03

## 2021-01-29 NOTE — PROGRESS NOTES
[facility] - Progress Note    Consultation request by Madison Sanchez MD for advice/opinion related to evaluating Syncope [R55]  Bradycardia [R00.1]    Date of  Admission: 1/26/2021  9:53 PM   Primary Care Physician:  Minoo New MD      I saw, evaluated, interviewed and examined the patient personally. I agree with the findings and plan of care as documented below with PA-C note  Patient is awake. Not oriented   Denies complaints  BP stable, rather hypertensive  No evidence of high-grade AV block on telemetry monitor  If necessary, will consider hydralazine for blood pressure which potentially can cause reflex tachycardia  Discussed with daughter and the grandson today on the phone extensively. Different management strategy discussed including observation, event monitoring, subcutaneous loop recorder implantation or pacemaker. Risk, benefit, alternatives, complication associated with each management strategy discussed in detail. After lengthy discussion, daughter and the grandson would like to consider only noninvasive monitoring at this time because of her advanced age and advanced dementia.         Mukesh Skinner MD          Assessment:     - possible Syncope, CT Head No acute intracranial abnormalities, ECG on admission Sinus Karlee Bristle with LBBB, no evidence of High degree heart block;   - Bradycardia, does not appear to be on any AV leona blocking agents prior to admission     - Cardiomyopathy, ECHO (01/2021) EF 40-45%; ECHO (2016) EF 40%, mild AR   - LBBB (2013)   - Hypothyroidism, on synthroid, TSH 5.34 this admission   - Carotid artery duplex (2014) no evidence of stenosis   - DM2, latest hemoglobin A1c 5.7 (01/2021)   - Hypertension   - HLD   - EEG 2016 consistent with moderate encephalopathy   - Breast Ca hx, left Mastectomy 1969     Plan:     Patient remains hemodynamically stable and asymptomatic, on tele HR trending in 40s-50s, no pauses noted   Will continue conservative management and observation, patient is likely a sub optimal candidate for pacer placement    Repeat ECG marked sinus bradycardia w/o high degree AV block   Avoid any AV leona blocking agents  UTI, mgmt per primary team   Continue telemetry    Subjective:     Patient resting comfortably in bed. Pleasantly confused. Past Medical History:     Past Medical History:   Diagnosis Date    Breast cancer ([facility] [address]     Diabetes ([facility]     Hypertension     Hypothyroid          Social History:     Social History     Socioeconomic History    Marital status: SINGLE     Spouse name: Not on file    Number of children: Not on file    Years of education: Not on file    Highest education level: Not on file   Tobacco Use    Smoking status: Never Smoker    Smokeless tobacco: Never Used   Substance and Sexual Activity    Alcohol use: No    Drug use: No        Family History:   History reviewed. No pertinent family history.      Medications:   No Known Allergies     Current Facility-Administered Medications   Medication Dose Route Frequency    levothyroxine (SYNTHROID) tablet 125 mcg  125 mcg Oral 6am    enoxaparin (LOVENOX) injection 40 mg  40 mg SubCUTAneous DAILY    donepeziL (ARICEPT) tablet 5 mg  5 mg Oral DAILY    memantine (NAMENDA) tablet 5 mg  5 mg Oral DAILY    atorvastatin (LIPITOR) tablet 10 mg  10 mg Oral QHS    sodium chloride (NS) flush 5-40 mL  5-40 mL IntraVENous Q8H    sodium chloride (NS) flush 5-40 mL  5-40 mL IntraVENous PRN    acetaminophen (TYLENOL) tablet 650 mg  650 mg Oral Q6H PRN    Or    acetaminophen (TYLENOL) suppository 650 mg  650 mg Rectal Q6H PRN    polyethylene glycol (MIRALAX) packet 17 g  17 g Oral DAILY PRN    promethazine (PHENERGAN) tablet 12.5 mg  12.5 mg Oral Q6H PRN    Or    ondansetron (ZOFRAN) injection 4 mg  4 mg IntraVENous Q6H PRN    0.9% sodium chloride infusion  75 mL/hr IntraVENous CONTINUOUS    insulin lispro (HUMALOG) injection   SubCUTAneous AC&HS    glucose chewable tablet 16 g  4 Tab Oral PRN    glucagon (GLUCAGEN) injection 1 mg  1 mg IntraMUSCular PRN    dextrose (D50) infusion 12.5-25 g  25-50 mL IntraVENous PRN    cefTRIAXone (ROCEPHIN) 1 g in 0.9% sodium chloride (MBP/ADV) 50 mL MBP  1 g IntraVENous Q24H         Physical Exam:     Visit Vitals  /79 (BP 1 Location: Left upper arm, BP Patient Position: Supine)   Pulse (!) 52   Temp 97.6 °F (36.4 °C)   Resp 18   Ht 5' (1.524 m)   Wt 131 lb (59.4 kg)   SpO2 96%   BMI 25.58 kg/m²     BP Readings from Last 3 Encounters:   01/29/21 131/79   11/29/19 124/83   03/21/16 131/50     Pulse Readings from Last 3 Encounters:   01/29/21 (!) 52   11/29/19 66   03/21/16 75     Wt Readings from Last 3 Encounters:   01/28/21 131 lb (59.4 kg)   11/29/19 149 lb 11.2 oz (67.9 kg)   03/21/16 139 lb 12.8 oz (63.4 kg)       General:  cooperative, no distress, appears stated age, confused  Neck:  no JVD appreciated   Lungs:  clear to auscultation bilaterally  Heart:  Bradycardic and regular rhythm, S1, S2 normal, no murmur, click, rub or gallop  Abdomen:  abdomen is soft without significant tenderness, masses, organomegaly or guarding  Extremities:  extremities normal, atraumatic, no cyanosis or edema       Data Review:     Recent Labs     01/29/21  0350 01/28/21  0245 01/26/21  2325   WBC 6.1 6.2 7.6   HGB 11.8* 11.8* 13.0   HCT 37.6 37.1 41.0    221 281     Recent Labs     01/29/21  0350 01/28/21  0245 01/26/21  2236    141 139   K 4.0 3.8 3.8    107 105   CO2 29 30 31   GLU 94 86 143*   BUN 19* 19* 24*   CREA 0.88 0.78 1.51*   CA 8.3* 8.4* 9.2   MG 2.1 2.2  --    PHOS 2.3* 2.8  --    ALB  --   --  3.5   ALT  --   --  15       Results for orders placed or performed during the hospital encounter of 01/26/21   EKG, 12 LEAD, INITIAL   Result Value Ref Range    Ventricular Rate 52 BPM    Atrial Rate 52 BPM    P-R Interval 152 ms    QRS Duration 150 ms    Q-T Interval 500 ms    QTC Calculation (Bezet) 465 ms    Calculated P Axis 59 degrees    Calculated R Axis 15 degrees    Calculated T Axis -162 degrees    Diagnosis       Sinus bradycardia  Left bundle branch block  Abnormal ECG  When compared with ECG of 18-MAY-2014 19:31,  No significant change was found  Confirmed by Indio Lai ([id number]) on 1/27/2021 1:43:16 PM         All Cardiac Markers in the last 24 hours:  No results found for: CPK, CK, CKMMB, CKMB, RCK3, CKMBT, CKNDX, CKND1, JORJE, TROPT, TROIQ, SHARMILA, TROPT, TNIPOC, BNP, BNPP    Last Lipid:    Lab Results   Component Value Date/Time    Cholesterol, total 140 05/20/2014 05:03 AM    HDL Cholesterol 76 (H) 05/20/2014 05:03 AM    LDL, calculated 51.6 05/20/2014 05:03 AM    Triglyceride 62 05/20/2014 05:03 AM    CHOL/HDL Ratio 1.8 05/20/2014 05:03 AM       Signed By: Albina Lennox, PA-C     January 29, 2021

## 2021-01-29 NOTE — PROGRESS NOTES
Multiple attempts to call daughter to complete discharge plan. Voice mail left.  If family calls please ask them to call Marbella Pierce tomorrow [phone]

## 2021-01-29 NOTE — PROGRESS NOTES
Hospitalist Progress Note             Date of Service:  2021  NAME:  Aguila Randle  :  10/18/1925  MRN:  [id number]    Assessment   Principal Problem:    Syncope (2014)     Active Problems:    DM II (diabetes mellitus, type II), controlled ([facility] [address] (2014)           Plan      Syncope              - having significant bradycardia into 30s, may be related to syncope, also uti could be contributing,               - treat uti as below              - avoid blocking agents              - discussed need for pacer with both family and cardiology- declined for now              - echo poor study, ef 40-45%, moderate TR, moderate pulm htn- at this age and functional status nothing addressible beside medical therapy     DM              - SSI     UTI  - ceftriaxone, dc on keflex     Hypothyroidism  - will make small increase in dose, as tsh remains elevated.     Hold any HTN meds    Start PT today, plan on dc tomorrow after seen by cardiology. Hospital Problems  Date Reviewed: 9/10/2013          Codes Class Noted POA    Bradycardia ICD-10-CM: R00.1  ICD-9-CM: 427.89  2021 Unknown        * (Principal) Syncope ICD-10-CM: R55  ICD-9-CM: 780.2  2014 Unknown        DM II (diabetes mellitus, type II), controlled ([facility] [address] ICD-10-CM: E11.9  ICD-9-CM: 250.00  2014 Yes                Review of Systems:   A comprehensive review of systems was negative except for that written in the HPI. Vital Signs:    Last 24hrs VS reviewed since prior progress note.  Most recent are:  Visit Vitals  BP (!) 150/73 (BP 1 Location: Left lower arm, BP Patient Position: Supine)   Pulse (!) 53   Temp 97.5 °F (36.4 °C)   Resp 18   Ht 5' (1.524 m)   Wt 59.4 kg (131 lb)   SpO2 98%   BMI 25.58 kg/m²         Intake/Output Summary (Last 24 hours) at 2021 1228  Last data filed at 2021 0659  Gross per 24 hour   Intake 758.75 ml Output    Net 758.75 ml        Physical Examination:             General:          Alert, cooperative, no distress, appears stated age. HEENT:           Atraumatic, anicteric sclerae, pink conjunctivae                          No oral ulcers, mucosa moist, throat clear, dentition fair  Neck:               Supple, symmetrical  Lungs:             Clear to auscultation bilaterally. No Wheezing or Rhonchi. No rales. Chest wall:      No tenderness  No Accessory muscle use. Heart:              Regular  bradycardia  Abdomen:        Soft, non-tender. Not distended. Bowel sounds normal  Extremities:     No cyanosis. No clubbing,                            Skin turgor normal, Capillary refill normal  Skin:                Not pale. Not Jaundiced  No rashes   Psych:             Not anxious or agitated. Neurologic:      Alert, moves all extremities, answers questions appropriately and responds to commands        Data Review:    Review and/or order of clinical lab test  Review and/or order of tests in the radiology section of CPT  Review and/or order of tests in the medicine section of CPT      Labs:     Recent Labs     01/29/21  0350 01/28/21 0245   WBC 6.1 6.2   HGB 11.8* 11.8*   HCT 37.6 37.1    221     Recent Labs     01/29/21  0350 01/28/21  0245 01/26/21 2236    141 139   K 4.0 3.8 3.8    107 105   CO2 29 30 31   BUN 19* 19* 24*   CREA 0.88 0.78 1.51*   GLU 94 86 143*   CA 8.3* 8.4* 9.2   MG 2.1 2.2  --    PHOS 2.3* 2.8  --      Recent Labs     01/26/21 2236   ALT 15   *   TBILI 0.5   TP 8.1   ALB 3.5   GLOB 4.6*     No results for input(s): INR, PTP, APTT, INREXT in the last 72 hours. No results for input(s): FE, TIBC, PSAT, FERR in the last 72 hours. No results found for: FOL, RBCF   No results for input(s): PH, PCO2, PO2 in the last 72 hours.   Recent Labs     01/26/21 2236      TROIQ 0.03     Lab Results   Component Value Date/Time    Cholesterol, total 140 05/20/2014 05:03 AM    HDL Cholesterol 76 (H) 05/20/2014 05:03 AM    LDL, calculated 51.6 05/20/2014 05:03 AM    Triglyceride 62 05/20/2014 05:03 AM    CHOL/HDL Ratio 1.8 05/20/2014 05:03 AM     Lab Results   Component Value Date/Time    Glucose (POC) 158 (H) 01/29/2021 12:22 PM    Glucose (POC) 117 (H) 01/29/2021 08:29 AM    Glucose (POC) 96 01/28/2021 09:37 PM    Glucose (POC) 84 01/28/2021 04:22 PM    Glucose (POC) 78 01/28/2021 11:30 AM     Lab Results   Component Value Date/Time    Color YELLOW 01/26/2021 11:20 PM    Appearance CLOUDY 01/26/2021 11:20 PM    Specific gravity 1.026 01/26/2021 11:20 PM    pH (UA) 5.0 01/26/2021 11:20 PM    Protein 30 (A) 01/26/2021 11:20 PM    Glucose Negative 01/26/2021 11:20 PM    Ketone TRACE (A) 01/26/2021 11:20 PM    Bilirubin Negative 01/26/2021 11:20 PM    Urobilinogen 1.0 01/26/2021 11:20 PM    Nitrites Positive (A) 01/26/2021 11:20 PM    Leukocyte Esterase SMALL (A) 01/26/2021 11:20 PM    Epithelial cells FEW 01/26/2021 11:20 PM    Bacteria 2+ (A) 01/26/2021 11:20 PM    WBC 3 to 5 01/26/2021 11:20 PM    RBC 0 to 2 01/26/2021 11:20 PM         Medications Reviewed:     Current Facility-Administered Medications   Medication Dose Route Frequency   • levothyroxine (SYNTHROID) tablet 125 mcg  125 mcg Oral 6am   • enoxaparin (LOVENOX) injection 40 mg  40 mg SubCUTAneous DAILY   • donepeziL (ARICEPT) tablet 5 mg  5 mg Oral DAILY   • memantine (NAMENDA) tablet 5 mg  5 mg Oral DAILY   • atorvastatin (LIPITOR) tablet 10 mg  10 mg Oral QHS   • sodium chloride (NS) flush 5-40 mL  5-40 mL IntraVENous Q8H   • sodium chloride (NS) flush 5-40 mL  5-40 mL IntraVENous PRN   • acetaminophen (TYLENOL) tablet 650 mg  650 mg Oral Q6H PRN    Or   • acetaminophen (TYLENOL) suppository 650 mg  650 mg Rectal Q6H PRN   • polyethylene glycol (MIRALAX) packet 17 g  17 g Oral DAILY PRN   • promethazine (PHENERGAN) tablet 12.5 mg  12.5 mg Oral Q6H PRN    Or   • ondansetron (ZOFRAN) injection 4 mg  4 mg IntraVENous  Q6H PRN    0.9% sodium chloride infusion  75 mL/hr IntraVENous CONTINUOUS    insulin lispro (HUMALOG) injection   SubCUTAneous AC&HS    glucose chewable tablet 16 g  4 Tab Oral PRN    glucagon (GLUCAGEN) injection 1 mg  1 mg IntraMUSCular PRN    dextrose (D50) infusion 12.5-25 g  25-50 mL IntraVENous PRN    cefTRIAXone (ROCEPHIN) 1 g in 0.9% sodium chloride (MBP/ADV) 50 mL MBP  1 g IntraVENous Q24H     ______________________________________________________________________  EXPECTED LENGTH OF STAY: 2d 7h  ACTUAL LENGTH OF STAY:          1                 Ezio Mason MD

## 2021-01-29 NOTE — ROUTINE PROCESS
2350: Assumed care. Awake. Quietly resting in bed. Denies any pain or discomfort at this time. Call light within reach. Bed alarm on. 
 
0004: Patient allowed this nurse to placed new PIV line to Left FA gauge # 22. 0130: Patient is more cooperative/interactive now. Expressed the need/help for incontinent care. 0421: No change from previous assessment. 5424: Slept on & off thru night. Needs attended. Due med given. 0720: Bedside and Verbal shift change report given to [address] (oncoming nurse) by me (offgoing nurse).  Report included the following information SBAR, Kardex, Intake/Output, MAR, Recent Results and Cardiac Rhythm SB.

## 2021-01-29 NOTE — PROGRESS NOTES
Patient bathed, linen changed, incontinent care, gown and socks changed. Patient tolerated well. EKG leads changed. Bed locked and in lowest position.

## 2021-01-29 NOTE — PROGRESS NOTES
Assume care of patient lying in bed. Alert with confusion noted. Denies pain or discomfort at present. Bed locked in lowest position. Call light within reach and understand to use for assistance and needs. 2130 Patient agitated and refused to have IV reinserted after pulling it out earlier. Very confused and explain to her about IV to be placed in arm. States \"Not right now. \"    2928 Patient resting quietly at present. No complaints voiced. Remains awake. Medication tolerated without problems. 01/29/2021    0015 Bedside and Verbal shift change report given to CRISTOFER Fuller (oncoming nurse) by Maegan Fraga RN (offgoing nurse). Report given with SBAR, Kardex, Intake/Output, MAR and Recent Results.

## 2021-01-29 NOTE — PROGRESS NOTES
1/29/2021  I talked with Jorge Dixon her son Nathaniel Perez. Patient will go home at dc- will need stretcher transport. Contact daughter a dn grandson- [phone] and [phone]. Of note: Bishop Garcia wants to know how to get personal care at home. He says her medicaid is new.

## 2021-01-30 ENCOUNTER — HOME HEALTH ADMISSION (OUTPATIENT)
Dept: HOME HEALTH SERVICES | Facility: HOME HEALTH | Age: 86
End: 2021-01-30
Payer: MEDICARE

## 2021-01-30 VITALS
BODY MASS INDEX: 25.72 KG/M2 | WEIGHT: 131 LBS | HEART RATE: 54 BPM | RESPIRATION RATE: 18 BRPM | OXYGEN SATURATION: 100 % | HEIGHT: 60 IN | SYSTOLIC BLOOD PRESSURE: 152 MMHG | DIASTOLIC BLOOD PRESSURE: 71 MMHG | TEMPERATURE: 97.5 F

## 2021-01-30 LAB
GLUCOSE BLD STRIP.AUTO-MCNC: 88 MG/DL (ref 70–110)
GLUCOSE BLD STRIP.AUTO-MCNC: 90 MG/DL (ref 70–110)

## 2021-01-30 PROCEDURE — 2709999900 HC NON-CHARGEABLE SUPPLY

## 2021-01-30 PROCEDURE — 74011250637 HC RX REV CODE- 250/637: Performed by: INTERNAL MEDICINE

## 2021-01-30 PROCEDURE — 99232 SBSQ HOSP IP/OBS MODERATE 35: CPT | Performed by: INTERNAL MEDICINE

## 2021-01-30 PROCEDURE — 74011250637 HC RX REV CODE- 250/637: Performed by: HOSPITALIST

## 2021-01-30 PROCEDURE — 97162 PT EVAL MOD COMPLEX 30 MIN: CPT

## 2021-01-30 PROCEDURE — 97530 THERAPEUTIC ACTIVITIES: CPT

## 2021-01-30 PROCEDURE — 74011250636 HC RX REV CODE- 250/636: Performed by: INTERNAL MEDICINE

## 2021-01-30 PROCEDURE — 82962 GLUCOSE BLOOD TEST: CPT

## 2021-01-30 RX ORDER — CEPHALEXIN 500 MG/1
500 CAPSULE ORAL 4 TIMES DAILY
Qty: 6 CAP | Refills: 0 | Status: SHIPPED | OUTPATIENT
Start: 2021-01-30

## 2021-01-30 RX ORDER — LEVOTHYROXINE SODIUM 125 UG/1
125 TABLET ORAL
Qty: 30 TAB | Refills: 0 | Status: SHIPPED | OUTPATIENT
Start: 2021-01-31

## 2021-01-30 RX ORDER — AMLODIPINE BESYLATE 10 MG/1
5 TABLET ORAL DAILY
Qty: 30 TAB | Refills: 0 | Status: SHIPPED | OUTPATIENT
Start: 2021-01-30

## 2021-01-30 RX ORDER — AMLODIPINE BESYLATE 10 MG/1
10 TABLET ORAL DAILY
Qty: 30 TAB | Refills: 0 | Status: SHIPPED | OUTPATIENT
Start: 2021-01-30 | End: 2021-01-30 | Stop reason: SDUPTHER

## 2021-01-30 RX ADMIN — ENOXAPARIN SODIUM 40 MG: 40 INJECTION SUBCUTANEOUS at 09:59

## 2021-01-30 RX ADMIN — Medication 10 ML: at 06:24

## 2021-01-30 RX ADMIN — MEMANTINE 5 MG: 5 TABLET ORAL at 09:59

## 2021-01-30 RX ADMIN — DONEPEZIL HYDROCHLORIDE 5 MG: 5 TABLET, FILM COATED ORAL at 09:59

## 2021-01-30 RX ADMIN — LEVOTHYROXINE SODIUM 125 MCG: 0.03 TABLET ORAL at 06:24

## 2021-01-30 NOTE — PROGRESS NOTES
Bedside shift change report given to Roberta Garcia RN (oncoming nurse) by Gali Carnes. Cassandra Harrington RN (offgoing nurse). Report included the following information SBAR, Kardex and Cardiac Rhythm NSR. Patient currently has no IV. Per night nurse, the patient pulled out IV.

## 2021-01-30 NOTE — DISCHARGE INSTRUCTIONS
Vasovagal Syncope: Care Instructions  Your Care Instructions     Vasovagal syncope (say \"aja-voj-VDJBi VERAS-kuh-pee\")is sudden dizziness or fainting that can be set off by things such as pain, stress, fear, or trauma. You may sweat or feel lightheaded, sick to your stomach, or tingly. The problem causes the heart rate to slow and the blood vessels to widen, or dilate, for a short time. When this happens, blood pools in the lower body, and less blood goes to the brain. You can usually get relief by lying down with your legs raised (elevated). This helps more blood to flow to your brain and may help relieve symptoms like feeling dizzy. Some doctors may recommend a technique that involves tensing your fists and arms. This type of fainting is often easy to predict. For example, it happens to some people when they see blood or have to get a shot. They may feel symptoms before they faint. An episode of vasovagal syncope usually responds well to self-care. Other treatment often isn't needed. But if the fainting keeps happening, your doctor may suggest further treatments. Follow-up care is a key part of your treatment and safety. Be sure to make and go to all appointments, and call your doctor if you are having problems. It's also a good idea to know your test results and keep a list of the medicines you take. How can you care for yourself at home? · Drink plenty of fluids to prevent dehydration. If you have kidney, heart, or liver disease and have to limit fluids, talk with your doctor before you increase your fluid intake. · Try to avoid things that you think may set off vasovagal syncope. · Talk to your doctor about any medicines you take. Some medicines may increase the chance of this condition occurring. · If you feel symptoms, lie down with your legs raised. Talk to your doctor about what to do if your symptoms come back. When should you call for help?    Call 911 anytime you think you may need emergency care. For example, call if:    · You have symptoms of a heart problem. These may include:  ? Chest pain or pressure. ? Severe trouble breathing. ? A fast or irregular heartbeat. Watch closely for changes in your health, and be sure to contact your doctor if:    · You have more episodes of fainting at home.     · You do not get better as expected. Where can you learn more? Go to http://www.gray.com/  Enter L754 in the search box to learn more about \"Vasovagal Syncope: Care Instructions. \"  Current as of: June 26, 2019               Content Version: 12.6  © 6220-9965 [employer]. Care instructions adapted under license by [employer] (which disclaims liability or warranty for this information). If you have questions about a medical condition or this instruction, always ask your healthcare professional. [address] any warranty or liability for your use of this information. Patient Education        Bradycardia: Care Instructions  Your Care Instructions     Bradycardia is a slow heart rate. A slow heart rate can be normal and healthy. Or it could be a sign of a problem with the heart's electrical system. If your heart beats too slowly, it may not supply your body with enough blood. This can make you weak or dizzy. Or it may make you pass out. Bradycardia can be caused by many things. This includes medicine, certain medical conditions, and changes in the heart that are the result of aging. How bradycardia is treated depends on what is causing it. Treatments include treating another health problem, changing a medicine, and getting a pacemaker. Treatment also depends on the symptoms. If bradycardia doesn't cause symptoms, it may not be treated. You and your doctor can decide what treatment is right for you. Follow-up care is a key part of your treatment and safety.  Be sure to make and go to all appointments, and call your doctor if you are having problems. It's also a good idea to know your test results and keep a list of the medicines you take. How can you care for yourself at home? · Take your medicines exactly as prescribed. Call your doctor if you think you are having a problem with your medicine. If your bradycardia is caused by another disease, your doctor will try to treat the disease. If it is caused by heart medicines, he or she will adjust your medicines. · Make lifestyle changes to improve your heart health. ? Eat a heart-healthy diet that includes vegetables, fruits, nuts, beans, lean meat, fish, and whole grains. Limit alcohol, sodium, and sugar. ? Get regular exercise. Try for 30 minutes on most days of the week. If you do not have other heart problems, you likely do not have limits on the type or level of activity that you can do. You may want to walk, swim, bike, or do other activities. Ask your doctor what level of exercise is safe for you.  ? Stay at a healthy weight. Lose weight if you need to.  ? Do not smoke. If you need help quitting, talk to your doctor about stop-smoking programs and medicines. These can increase your chances of quitting for good. ? Manage other health problems such as high blood pressure, high cholesterol, and diabetes. · If you get a pacemaker, you will get information about it. When should you call for help? Call 911 anytime you think you may need emergency care. For example, call if:    · You have symptoms of sudden heart failure. These may include:  ? Severe trouble breathing. ? A fast or irregular heartbeat. ? Coughing up pink, foamy mucus. ? You passed out.     · You have symptoms of a stroke. These may include:  ? Sudden numbness, tingling, weakness, or loss of movement in your face, arm, or leg, especially on only one side of your body. ? Sudden vision changes. ? Sudden trouble speaking. ? Sudden confusion or trouble understanding simple statements.   ? Sudden problems with walking or balance. ? A sudden, severe headache that is different from past headaches. Call your doctor now or seek immediate medical care if:    · You have new or changed symptoms of heart failure, such as:  ? New or increased shortness of breath. ? New or worse swelling in your legs, ankles, or feet. ? Sudden weight gain, such as more than 2 to 3 pounds in a day or 5 pounds in a week. (Your doctor may suggest a different range of weight gain.)  ? Feeling dizzy or lightheaded or like you may faint. ? Feeling so tired or weak that you cannot do your usual activities. ? Not sleeping well. Shortness of breath wakes you at night. You need extra pillows to prop yourself up to breathe easier. Watch closely for changes in your health, and be sure to contact your doctor if:    · You do not get better as expected. Where can you learn more? Go to http://www.gray.com/  Enter N932 in the search box to learn more about \"Bradycardia: Care Instructions. \"  Current as of: December 16, 2019               Content Version: 12.6  © 9923-9080 [employer]. Care instructions adapted under license by [employer] (which disclaims liability or warranty for this information). If you have questions about a medical condition or this instruction, always ask your healthcare professional. [address] any warranty or liability for your use of this information. Patient Education        Learning About a Pacemaker  What is a pacemaker? A pacemaker is a small device. It sends out mild electrical signals that keep your heart beating normally. The signals are painless. It can help stop the dizziness, fainting, and shortness of breath caused by a slow or unsteady heartbeat. A pacemaker is powered by batteries. Most pacemakers are placed under the skin of your chest. They have thin wires, called leads. The leads pass through a vein into your heart.   A pacemaker can help restore a normal heart rate. It is used when certain problems have damaged the heart's electrical system, which normally keeps your heart beating steadily. You may feel worried about having a pacemaker. This is common. It can help if you learn about how the pacemaker helps your heart. Talk to your doctor about your concerns. How is a pacemaker put in place? You will get medicine before the procedure. It helps you relax and helps prevent pain. The doctor makes a cut in the skin just below your collarbone. The cut may be on either side of your chest. The doctor will put the pacemaker leads through the cut. The leads go into a large blood vessel in the upper chest. Then the doctor will guide the leads through the blood vessel into the heart. The leads are placed in one or two of the chambers in the heart. The doctor will place the pacemaker under the skin of your chest. The doctor will attach the leads to the pacemaker. Then the cut will be closed with stitches. What can you expect when you have a pacemaker? A pacemaker can help you return to a more normal, more active life. You'll need to use certain electric devices with caution. Some devices have a strong electromagnetic field. This field can keep your pacemaker from working right for a short time. These devices include things in your home, garage, or workplace. Check with your doctor about what you need to avoid and what you need to keep a short distance away from your pacemaker. Many household and office electronics do not affect your pacemaker. Your doctor will check your pacemaker regularly to make sure it is working right. Pacemaker batteries usually last 5 to 15 years before they need to be replaced. Follow-up care is a key part of your treatment and safety. Be sure to make and go to all appointments, and call your doctor if you are having problems.  It's also a good idea to know your test results and keep a list of the medicines you take.  Where can you learn more? Go to http://www.gray.com/  Enter M014 in the search box to learn more about \"Learning About a Pacemaker. \"  Current as of: December 16, 2019               Content Version: 12.6  © 1527-1309 [employer]. Care instructions adapted under license by [employer] (which disclaims liability or warranty for this information). If you have questions about a medical condition or this instruction, always ask your healthcare professional. [place] [address] any warranty or liability for your use of this information. Patient Education        Type 2 Diabetes: Care Instructions  Your Care Instructions     Type 2 diabetes is a disease that develops when the body's tissues cannot use insulin properly. Over time, the pancreas cannot make enough insulin. Insulin is a hormone that helps the body's cells use sugar (glucose) for energy. It also helps the body store extra sugar in muscle, fat, and liver cells. Without insulin, the sugar cannot get into the cells to do its work. It stays in the blood instead. This can cause high blood sugar levels. A person has diabetes when the blood sugar stays too high too much of the time. Over time, diabetes can lead to diseases of the heart, blood vessels, nerves, kidneys, and eyes. You may be able to control your blood sugar by losing weight, eating a healthy diet, and getting daily exercise. You may also have to take insulin or other diabetes medicine. Follow-up care is a key part of your treatment and safety. Be sure to make and go to all appointments. Call your doctor if you are having problems. It's also a good idea to know your test results and keep a list of the medicines you take. How can you care for yourself at home? · Keep your blood sugar at a target level (which you set with your doctor). ? Eat a good diet that spreads carbohydrate throughout the day.  Carbohydrate--the body's main source of fuel--affects blood sugar more than any other nutrient. Carbohydrate is in fruits, vegetables, milk, and yogurt. It also is in breads, cereals, vegetables such as potatoes and corn, and sugary foods such as candy and cakes. ? Aim for 30 minutes of exercise on most, preferably all, days of the week. Walking is a good choice. You also may want to do other activities, such as running, swimming, cycling, or playing tennis or team sports. If your doctor says it's okay, do muscle-strengthening exercises at least 2 times a week. ? Take your medicines exactly as prescribed. Call your doctor if you think you are having a problem with your medicine. You will get more details on the specific medicines your doctor prescribes. · Check your blood sugar as often as your doctor recommends. It is important to keep track of any symptoms you have, such as low blood sugar. Also tell your doctor if you have any changes in your activities, diet, or insulin use. · Talk to your doctor before you start taking aspirin every day. Aspirin can help certain people lower their risk of a heart attack or stroke. But taking aspirin isn't right for everyone, because it can cause serious bleeding. · Do not smoke. If you need help quitting, talk to your doctor about stop-smoking programs and medicines. These can increase your chances of quitting for good. · Keep your cholesterol and blood pressure at normal levels. You may need to take one or more medicines to reach your goals. Take them exactly as directed. Do not stop or change a medicine without talking to your doctor first.  When should you call for help? Call 911 anytime you think you may need emergency care. For example, call if:    · You passed out (lost consciousness), or you suddenly become very sleepy or confused.  (You may have very low blood sugar.)   Call your doctor now or seek immediate medical care if:    · Your blood sugar is 300 mg/dL or is higher than the level your doctor has set for you.     · You have symptoms of low blood sugar, such as:  ? Sweating. ? Feeling nervous, shaky, and weak. ? Extreme hunger and slight nausea. ? Dizziness and headache.  ? Blurred vision. ? Confusion. Watch closely for changes in your health, and be sure to contact your doctor if:    · You often have problems controlling your blood sugar.     · You have symptoms of long-term diabetes problems, such as:  ? New vision changes. ? New pain, numbness, or tingling in your hands or feet. ? Skin problems. Where can you learn more? Go to http://www.gray.com/  Enter C553 in the search box to learn more about \"Type 2 Diabetes: Care Instructions. \"  Current as of: December 20, 2019               Content Version: 12.6  © 7725-2923 [employer]. Care instructions adapted under license by [employer] (which disclaims liability or warranty for this information). If you have questions about a medical condition or this instruction, always ask your healthcare professional. [place] [address] any warranty or liability for your use of this information. Patient Education        Fainting: Care Instructions  Your Care Instructions     When you faint, or pass out, you lose consciousness for a short time. A brief drop in blood flow to the brain often causes it. When you fall or lie down, more blood flows to your brain and you regain consciousness. Emotional stress, pain, or overheating--especially if you have been standing--can make you faint. In these cases, fainting is usually not serious. But fainting can be a sign of a more serious problem. Your doctor may want you to have more tests to rule out other causes. The treatment you need depends on the reason why you fainted. The doctor has checked you carefully, but problems can develop later. If you notice any problems or new symptoms, get medical treatment right away.   Follow-up care is a key part of your treatment and safety. Be sure to make and go to all appointments, and call your doctor if you are having problems. It's also a good idea to know your test results and keep a list of the medicines you take. How can you care for yourself at home? · Drink plenty of fluids to prevent dehydration. If you have kidney, heart, or liver disease and have to limit fluids, talk with your doctor before you increase your fluid intake. When should you call for help? Call 911 anytime you think you may need emergency care. For example, call if:    · You have symptoms of a heart problem. These may include:  ? Chest pain or pressure. ? Severe trouble breathing. ? A fast or irregular heartbeat. ? Lightheadedness or sudden weakness. ? Coughing up pink, foamy mucus. ? Passing out. After you call 911, the  may tell you to chew 1 adult-strength or 2 to 4 low-dose aspirin. Wait for an ambulance. Do not try to drive yourself.     · You have symptoms of a stroke. These may include:  ? Sudden numbness, tingling, weakness, or loss of movement in your face, arm, or leg, especially on only one side of your body. ? Sudden vision changes. ? Sudden trouble speaking. ? Sudden confusion or trouble understanding simple statements. ? Sudden problems with walking or balance. ? A sudden, severe headache that is different from past headaches.     · You passed out (lost consciousness) again. Watch closely for changes in your health, and be sure to contact your doctor if:    · You do not get better as expected. Where can you learn more? Go to http://www.gray.com/  Enter A848 in the search box to learn more about \"Fainting: Care Instructions. \"  Current as of: June 26, 2019               Content Version: 12.6  © 3595-4507 [employer] [facility]. Care instructions adapted under license by [employer] (which disclaims liability or warranty for this information).  If you have questions about a medical condition or this instruction, always ask your healthcare professional. [address] any warranty or liability for your use of this information. Patient Education     Skin Tears: Care Instructions  Your Care Instructions  As we get older, our skin gets drier and more fragile. Sometimes this can cause the outer layers of skin to split and tear open. Skin tears are treated in different ways. In some cases, doctors use pieces of tape called Steri-Strips to pull the skin together and help it heal. Other times, it's best to leave the tear open and cover it with a special wound-care bandage. Skin tears are usually not serious. They usually heal in a few weeks. But how long you take to heal depends on your body and the type of tear you have. Sometimes the torn piece of skin is used to protect the wound while it heals. But that piece of skin does not heal. It may fall off on its own. Or the doctor may remove it. As your tear heals, it's important to keep it clean to help prevent infection. The doctor has checked you carefully, but problems can develop later. If you notice any problems or new symptoms, get medical treatment right away. Follow-up care is a key part of your treatment and safety. Be sure to make and go to all appointments, and call your doctor if you are having problems. It's also a good idea to know your test results and keep a list of the medicines you take. How can you care for yourself at home? · If you have pain, ask your doctor if you can take an over-the-counter pain medicine, such as acetaminophen (Tylenol), ibuprofen (Advil, Motrin), or naproxen (Aleve). Be safe with medicines. Read and follow all instructions on the label. · If you have a bandage, follow your doctor's instructions for changing it. · If you have Steri-Strips, leave them on until they fall off. · Follow your doctor's instructions about bathing.   · Gently wash the skin tear with plain water 2 times a day. Do not rub the area. · Let the area air dry. Or you can pat it carefully with a soft towel. When should you call for help? Call your doctor now or seek immediate medical care if:  · You have signs of infection, such as:  ¨ Increased pain, swelling, warmth, or redness around the tear. ¨ Red streaks leading from the tear. ¨ Pus draining from the tear. ¨ A fever. · The tear starts to bleed a lot. Small amounts of blood are normal.  Watch closely for changes in your health, and be sure to contact your doctor if:  · You do not get better as expected. Where can you learn more? Go to popAD.be  Enter D167 in the search box to learn more about \"Skin Tears: Care Instructions. \"   © 8088-8499 [employer]. Care instructions adapted under license by [facility] (which disclaims liability or warranty for this information). This care instruction is for use with your licensed healthcare professional. If you have questions about a medical condition or this instruction, always ask your healthcare professional. [place] [address] any warranty or liability for your use of this information. Content Version: 55.6.568624; Current as of: May 22, 2015           Patient Education     Skin Tears: Care Instructions  Your Care Instructions  As we get older, our skin gets drier and more fragile. Sometimes this can cause the outer layers of skin to split and tear open. Skin tears are treated in different ways. In some cases, doctors use pieces of tape called Steri-Strips to pull the skin together and help it heal. Other times, it's best to leave the tear open and cover it with a special wound-care bandage. Skin tears are usually not serious. They usually heal in a few weeks. But how long you take to heal depends on your body and the type of tear you have. Sometimes the torn piece of skin is used to protect the wound while it heals.  But that piece of skin does not heal. It may fall off on its own. Or the doctor may remove it. As your tear heals, it's important to keep it clean to help prevent infection. The doctor has checked you carefully, but problems can develop later. If you notice any problems or new symptoms, get medical treatment right away. Follow-up care is a key part of your treatment and safety. Be sure to make and go to all appointments, and call your doctor if you are having problems. It's also a good idea to know your test results and keep a list of the medicines you take. How can you care for yourself at home? · If you have pain, ask your doctor if you can take an over-the-counter pain medicine, such as acetaminophen (Tylenol), ibuprofen (Advil, Motrin), or naproxen (Aleve). Be safe with medicines. Read and follow all instructions on the label. · If you have a bandage, follow your doctor's instructions for changing it. · If you have Steri-Strips, leave them on until they fall off. · Follow your doctor's instructions about bathing. · Gently wash the skin tear with plain water 2 times a day. Do not rub the area. · Let the area air dry. Or you can pat it carefully with a soft towel. When should you call for help? Call your doctor now or seek immediate medical care if:  · You have signs of infection, such as:  ¨ Increased pain, swelling, warmth, or redness around the tear. ¨ Red streaks leading from the tear. ¨ Pus draining from the tear. ¨ A fever. · The tear starts to bleed a lot. Small amounts of blood are normal.  Watch closely for changes in your health, and be sure to contact your doctor if:  · You do not get better as expected. Where can you learn more? Go to [employer].be  Enter A951 in the search box to learn more about \"Skin Tears: Care Instructions. \"   © 3477-7335 [employer]wise[employer] Incorporated.  Care instructions adapted under license by [employer] (which disclaims liability or warranty for this information). This care instruction is for use with your licensed healthcare professional. If you have questions about a medical condition or this instruction, always ask your healthcare professional. [employer] disclaims any warranty or liability for your use of this information.  Content Version: 10.8.595873; Current as of: May 22, 2015         As part of the discharge instructions, medications already given today were discussed with the patient. The next dose due of all ordered meds was highlighted as part of the medication discharge instructions. Discussed with the patient the importance of taking medications as directed, as well as the side effects and adverse reactions to medications ordered.     DISCHARGE SUMMARY from Nurse    PATIENT INSTRUCTIONS:    After general anesthesia or intravenous sedation, for 24 hours or while taking prescription Narcotics:  · Limit your activities  · Do not drive and operate hazardous machinery  · Do not make important personal or business decisions  · Do  not drink alcoholic beverages  · If you have not urinated within 8 hours after discharge, please contact your surgeon on call.    Report the following to your surgeon:  · Excessive pain, swelling, redness or odor of or around the surgical area  · Temperature over 100.5  · Nausea and vomiting lasting longer than 4 hours or if unable to take medications  · Any signs of decreased circulation or nerve impairment to extremity: change in color, persistent  numbness, tingling, coldness or increase pain  · Any questions    What to do at Home:  Recommended activity: Activity as tolerated,     If you experience any of the following symptoms chest pains, shortness of breath, dizziness, temperature greater 100.5, nausea, vomiting, diarrhea, severe pain, please follow up with PCP or 911.    *  Please give a list of your current medications to your Primary Care Provider.    *  Please update this list whenever your  medications are discontinued, doses are      changed, or new medications (including over-the-counter products) are added. *  Please carry medication information at all times in case of emergency situations. These are general instructions for a healthy lifestyle:    No smoking/ No tobacco products/ Avoid exposure to second hand smoke  Surgeon General's Warning:  Quitting smoking now greatly reduces serious risk to your health. Obesity, smoking, and sedentary lifestyle greatly increases your risk for illness    A healthy diet, regular physical exercise & weight monitoring are important for maintaining a healthy lifestyle    You may be retaining fluid if you have a history of heart failure or if you experience any of the following symptoms:  Weight gain of 3 pounds or more overnight or 5 pounds in a week, increased swelling in our hands or feet or shortness of breath while lying flat in bed. Please call your doctor as soon as you notice any of these symptoms; do not wait until your next office visit. The discharge information has been reviewed with the patient and caregiver. The patient and caregiver verbalized understanding. Discharge medications reviewed with the patient and caregiver and appropriate educational materials and side effects teaching were provided.   ___________________________________________________________________________________________________________________________________  Patient armband removed and shredded

## 2021-01-30 NOTE — PROGRESS NOTES
Problem: Discharge Planning  Goal: *Discharge to safe environment  Resolved/met    Plan home with hh    Spoke with dtr, denisse, who is 66. Pt lives with her and denisse son. Denisse's dtr in law cares for pt on wkends. She states pt ambulated with a walking stick. She needs help getting bathed. She feeds herself. Tee Anderson states they have applied for donnie, and pt is on a spend down. Explained when pt gets donnie she can get help from personal care. Also  Spoke to denisse's son, he asked we send some brochures home of agency who do pvt pay cna. Sent 4 brochures , senior greyson, home instead. [facility] Place [facility]h orders after speaking with dr Gurjit Cannon. Referral in [facility]. Pt's correct address is [address] [place] [address] [facility]  Provider list has been given to the patient and/or patient representative. Patient and/or patient representative has signed the [place] of Choice selecting ______bs___________________as their preference agency and a copy given. Both Home Health Provider list and Freedom of Choice have been placed on the chart. Notified [facility], spoke with Saul Parham 24 Management Interventions  PCP Verified by CM:  Yes  Palliative Care Criteria Met (RRAT>21 & CHF Dx)?: No  Mode of Transport at Discharge: BLS  Transition of Care Consult (CM Consult): [address]: Yes  Discharge Durable Medical Equipment: No  Physical Therapy Consult: No  Occupational Therapy Consult: No  Current Support Network: Relative's Home  Confirm Follow Up Transport: Other (see comment)  The Plan for Transition of Care is Related to the Following Treatment Goals : sn pt/ot  hh aide  The Patient and/or Patient Representative was Provided with a Choice of Provider and Agrees with the Discharge Plan?: Yes  Name of the Patient Representative Who was Provided with a Choice of Provider and Agrees with the Discharge Plan: daughter Tam Gong of Choice List was Provided with Basic Dialogue that Supports the Patient's Individualized Plan of Care/Goals, Treatment Preferences and Shares the Quality Data Associated with the Providers?: Yes  Discharge Location  Discharge Placement: Home with home health

## 2021-01-30 NOTE — PROGRESS NOTES
Transport set with [facility] to . [address] [address] [address]. Spoke with Percy Hannon. fax'd pcs, confirm obtained. Placed in chart on unit.

## 2021-01-30 NOTE — PROGRESS NOTES
Problem: Falls - Risk of  Goal: *Absence of Falls  Description: Document Clydene Bone Fall Risk and appropriate interventions in the flowsheet. Outcome: Progressing Towards Goal  Note: Fall Risk Interventions:  Mobility Interventions: Bed/chair exit alarm, Patient to call before getting OOB, PT Consult for mobility concerns    Mentation Interventions: Adequate sleep, hydration, pain control    Medication Interventions: Bed/chair exit alarm, Patient to call before getting OOB, Teach patient to arise slowly    Elimination Interventions: Bed/chair exit alarm, Call light in reach, Patient to call for help with toileting needs    History of Falls Interventions: Bed/chair exit alarm, Room close to nurse's station         Problem: Patient Education: Go to Patient Education Activity  Goal: Patient/Family Education  Outcome: Progressing Towards Goal     Problem: Pressure Injury - Risk of  Goal: *Prevention of pressure injury  Description: Document Fabian Scale and appropriate interventions in the flowsheet.   Outcome: Progressing Towards Goal  Note: Pressure Injury Interventions:  Sensory Interventions: Assess changes in LOC, Keep linens dry and wrinkle-free, Pressure redistribution bed/mattress (bed type)    Moisture Interventions: Absorbent underpads    Activity Interventions: Pressure redistribution bed/mattress(bed type), PT/OT evaluation    Mobility Interventions: HOB 30 degrees or less, Pressure redistribution bed/mattress (bed type), PT/OT evaluation    Nutrition Interventions: Document food/fluid/supplement intake    Friction and Shear Interventions: Apply protective barrier, creams and emollients                Problem: Patient Education: Go to Patient Education Activity  Goal: Patient/Family Education  Outcome: Progressing Towards Goal     Problem: Pain  Goal: *Control of Pain  Outcome: Progressing Towards Goal     Problem: Patient Education: Go to Patient Education Activity  Goal: Patient/Family Education  Outcome: Progressing Towards Goal     Problem: Discharge Planning  Goal: *Discharge to safe environment  Outcome: Progressing Towards Goal

## 2021-01-30 NOTE — PROGRESS NOTES
[facility] - Progress Note    Consultation request by Dewayne Rainey MD for advice/opinion related to evaluating Syncope [R55]  Bradycardia [R00.1]    Date of  Admission: 1/26/2021  9:53 PM   Primary Care Physician:  Korina Weber MD       Assessment:     - possible Syncope, CT Head No acute intracranial abnormalities, ECG on admission Sinus Carli Revel with LBBB, no evidence of High degree heart block;   - Bradycardia, does not appear to be on any AV leona blocking agents prior to admission     - Cardiomyopathy, ECHO (01/2021) EF 40-45%; ECHO (2016) EF 40%, mild AR   - LBBB (2013)   - Hypothyroidism, on synthroid, TSH 5.34 this admission   - Carotid artery duplex (2014) no evidence of stenosis   - DM2, latest hemoglobin A1c 5.7 (01/2021)   - Hypertension   - HLD   - EEG 2016 consistent with moderate encephalopathy   - Breast Ca hx, left Mastectomy 1969     Plan:     BP stable, rather hypertensive  HR reported 50-60s. Not on tele monitor anymore. AVOID AV LEONA BLOCKING AGENT ( BB, CCB. ...)  If necessary, will consider hydralazine/amlodipine for blood pressure which potentially can cause reflex tachycardia  Will place event monitor as outpatient as discussion with daughter and grandson over the phone yesterday for further evaluation of symptoms with rhythm and heart rate. Akin Shirley MD       Subjective:     Patient resting comfortably in bed. Pleasantly confused. Past Medical History:     Past Medical History:   Diagnosis Date    [facility]     [facility]     Hypertension     Hypothyroid          Social History:     Social History     Socioeconomic History    Marital status: SINGLE     Spouse name: Not on file    Number of children: Not on file    Years of education: Not on file    Highest education level: Not on file   Tobacco Use    Smoking status: Never Smoker    Smokeless tobacco: Never Used   Substance and Sexual Activity    Alcohol use: No    Drug use:  No Family History:   History reviewed. No pertinent family history.      Medications:   No Known Allergies     Current Facility-Administered Medications   Medication Dose Route Frequency    levothyroxine (SYNTHROID) tablet 125 mcg  125 mcg Oral 6am    enoxaparin (LOVENOX) injection 40 mg  40 mg SubCUTAneous DAILY    donepeziL (ARICEPT) tablet 5 mg  5 mg Oral DAILY    memantine (NAMENDA) tablet 5 mg  5 mg Oral DAILY    atorvastatin (LIPITOR) tablet 10 mg  10 mg Oral QHS    sodium chloride (NS) flush 5-40 mL  5-40 mL IntraVENous Q8H    sodium chloride (NS) flush 5-40 mL  5-40 mL IntraVENous PRN    acetaminophen (TYLENOL) tablet 650 mg  650 mg Oral Q6H PRN    Or    acetaminophen (TYLENOL) suppository 650 mg  650 mg Rectal Q6H PRN    polyethylene glycol (MIRALAX) packet 17 g  17 g Oral DAILY PRN    promethazine (PHENERGAN) tablet 12.5 mg  12.5 mg Oral Q6H PRN    Or    ondansetron (ZOFRAN) injection 4 mg  4 mg IntraVENous Q6H PRN    0.9% sodium chloride infusion  75 mL/hr IntraVENous CONTINUOUS    insulin lispro (HUMALOG) injection   SubCUTAneous AC&HS    glucose chewable tablet 16 g  4 Tab Oral PRN    glucagon (GLUCAGEN) injection 1 mg  1 mg IntraMUSCular PRN    dextrose (D50) infusion 12.5-25 g  25-50 mL IntraVENous PRN    cefTRIAXone (ROCEPHIN) 1 g in 0.9% sodium chloride (MBP/ADV) 50 mL MBP  1 g IntraVENous Q24H         Physical Exam:     Visit Vitals  BP (!) 170/62 (BP 1 Location: Right upper arm)   Pulse 65   Temp 97.6 °F (36.4 °C)   Resp 18   Ht 5' (1.524 m)   Wt 131 lb (59.4 kg)   SpO2 97%   BMI 25.58 kg/m²     BP Readings from Last 3 Encounters:   01/30/21 (!) 170/62   11/29/19 124/83   03/21/16 131/50     Pulse Readings from Last 3 Encounters:   01/30/21 65   11/29/19 66   03/21/16 75     Wt Readings from Last 3 Encounters:   01/28/21 131 lb (59.4 kg)   11/29/19 149 lb 11.2 oz (67.9 kg)   03/21/16 139 lb 12.8 oz (63.4 kg)       General:  cooperative, no distress, appears stated age, confused  Neck:  no JVD appreciated   Lungs:  clear to auscultation bilaterally  Heart:  Bradycardic and regular rhythm, S1, S2 normal, no murmur, click, rub or gallop  Abdomen:  abdomen is soft without significant tenderness, masses, organomegaly or guarding  Extremities:  extremities normal, atraumatic, no cyanosis or edema       Data Review:     Recent Labs     01/29/21  0350 01/28/21  0245   WBC 6.1 6.2   HGB 11.8* 11.8*   HCT 37.6 37.1    221     Recent Labs     01/29/21  0350 01/28/21  0245    141   K 4.0 3.8    107   CO2 29 30   GLU 94 86   BUN 19* 19*   CREA 0.88 0.78   CA 8.3* 8.4*   MG 2.1 2.2   PHOS 2.3* 2.8       Results for orders placed or performed during the hospital encounter of 01/26/21   EKG, 12 LEAD, INITIAL   Result Value Ref Range    Ventricular Rate 52 BPM    Atrial Rate 52 BPM    P-R Interval 152 ms    QRS Duration 150 ms    Q-T Interval 500 ms    QTC Calculation (Bezet) 465 ms    Calculated P Axis 59 degrees    Calculated R Axis 15 degrees    Calculated T Axis -162 degrees    Diagnosis       Sinus bradycardia  Left bundle branch block  Abnormal ECG  When compared with ECG of 18-MAY-2014 19:31,  No significant change was found  Confirmed by Martha Fallon ([id number]) on 1/27/2021 1:43:16 PM         All Cardiac Markers in the last 24 hours:  No results found for: CPK, CK, CKMMB, CKMB, RCK3, CKMBT, CKNDX, CKND1, JORJE, TROPT, TROIQ, SHARMILA, TROPT, TNIPOC, BNP, BNPP    Last Lipid:    Lab Results   Component Value Date/Time    Cholesterol, total 140 05/20/2014 05:03 AM    HDL Cholesterol 76 (H) 05/20/2014 05:03 AM    LDL, calculated 51.6 05/20/2014 05:03 AM    Triglyceride 62 05/20/2014 05:03 AM    CHOL/HDL Ratio 1.8 05/20/2014 05:03 AM       Signed By: Joseph Duncan MD     January 30, 2021

## 2021-01-30 NOTE — PROGRESS NOTES
Bedside and Verbal shift change report given to Ruma Jolley  (oncoming nurse) by Julian Whitt (offgoing nurse). Report included the following information SBAR, Kardex, Procedure Summary, Intake/Output, MAR, Recent Results, Cardiac Rhythm SR and Quality Measures. SB/SR on monitor   Awake and alert pleasantly confused ,[place] ,Icontient BB. Makes several attempts to self transfer,difficult to redirect . This pm patient smeared bowel movement all over bed and room. Given bed bath and redirected back to bed. Continues be confused  Focused on pulling out IV . Wrapped in kerlix to discourage patient . Patient pulled PIV out tonight and has self inflicted skin tear to lower Left lower forearm . Has fair appetite feeds self. Bedside and Verbal shift change report given to Sister CANDICE RN  (oncoming nurse) by  Juan Fabian RN (offgoing nurse). Report included the following information SBAR, Kardex, Procedure Summary, Intake/Output, MAR, Recent Results, Cardiac Rhythm SR and Quality Measures.
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
## 2021-01-30 NOTE — PROGRESS NOTES
Patient discharge to home with Home Health per MD orders. Called discharge instruction to the patient's daughter Carrol Vences. Explained to the patient's daughter that the patient needs to follow-up with PCP within one and that her prescriptions were sent to [address] [place] [address]. Also, explained to the daughter that the patient will be transported home via medical transport with a pick-up time of 1500. Patient daughter stated understanding of discharge instruction, follow-up appointment and medication administration. Patient condition is stable.

## 2021-01-30 NOTE — PROGRESS NOTES
1955- Pt in bed resting alert and oriented to self found she العلي pulled out the iv,noted skin tear on the left forearm mepilex applied. denies pain at the moment. Assessement completed plan of care for the shift explained pt verbalized understanding.  , HOB elevated, bed low and in locked position. Call light and frequently used items within reach. Pt incontinent of stool care done pt made comfortable in bed.     2100  Patient getting out of bed stated that she wanted to use bathroom. Pt incontinent of urine care done, bed linen changed pt made comfortable in bed.    2200- Pt assisted out of bed to bedside commode voided. and then back to bed. Pt awake. .    2300  Patient awake resting. No sign of distress. Bed low, call bell within reach. 0000  Patient incontinent of urine and small stool care done pt made comfortable in bed.    0100  Patient  awake No sign of distress. Bed low, call bell within reach. bed alarm on.  0200  Patient is in bed, resting/ sleeping. No sign of distress. Bed low, call bell within reach. 0200- New iv inserted on the upper left arm gauge 22. IVF infusing well.     0300  Patient is in bed, resting/ sleeping. No sign of distress. Bed low, call bell within reach. 0400  Patient awake , iv line out . New one inserted on the right forearm gauge 22 and 20 . IVF infusing well. 0445- Pt at long last noted sleeping. IVF infusing well. Bed alarm on.    0800- During change of shift pt  found has removed all the iv lines. 0730  Bedside, Verbal, and Written shift change report given to [address] [place] [address] (oncoming nurse) by Sis Gregg RN (offgoing nurse). Report included the following information SBAR, Kardex, and MAR.

## 2021-01-30 NOTE — DISCHARGE SUMMARY
Discharge Summary       PATIENT ID: Zay Loja  MRN: [id number]   YOB: 1925    DATE OF ADMISSION: 1/26/2021  9:53 PM    DATE OF DISCHARGE: 01/30/21    PRIMARY CARE PROVIDER: Amrit Rivas MD     ATTENDING PHYSICIAN: Ezio Mason MD  DISCHARGING PROVIDER: Ezio Mason MD        CONSULTATIONS: None    PROCEDURES/SURGERIES: * No surgery found *    ADMITTING [facility] COURSE:   Zay Loja is a 80y.o. year old female who presents with syncope. She apparently has \"passed out\" twice in about a week both about a minute each at the dinner table. No symptoms previously were reported and post syncope she was confused but returned to baseline. This am when seen she is lethargic           DISCHARGE DIAGNOSES / PLAN:      Assessment   Principal Problem:    Syncope (5/18/2014)     Active Problems:    DM II (diabetes mellitus, type II), controlled ([address] (5/18/2014)           Plan      Syncope              - having significant bradycardia into 30s, may be related to syncope, also uti could be contributing,               - treat uti as below              - avoid blocking agents              - discussed need for pacer with both family and cardiology- declined for now              - echo poor study, ef 40-45%, moderate TR, moderate pulm htn- at this age and functional status nothing addressible beside medical therapy     DM              - SSI     UTI  - ceftriaxone, dc on keflex     Hypothyroidism  - will make small increase in dose, as tsh remains elevated.     Hold any HTN meds         Total dc time 35 mins    Ok for dc, discussed with cardiology, will place outpt monitor.         FOLLOW UP APPOINTMENTS:    Follow-up Information     Follow up With Specialties Details Why Contact Info    Talisha Nowak [address] [phone]               DISCHARGE MEDICATIONS:  Current Discharge Medication List      START taking these medications    Details   cephALEXin (Keflex) 500 mg capsule Take 1 Cap by mouth four (4) times daily. Qty: 6 Cap, Refills: 0      amLODIPine (Norvasc) 10 mg tablet Take 0.5 Tabs by mouth daily. Qty: 30 Tab, Refills: 0         CONTINUE these medications which have CHANGED    Details   levothyroxine (SYNTHROID) 125 mcg tablet Take 1 Tab by mouth every morning. Qty: 30 Tab, Refills: 0         CONTINUE these medications which have NOT CHANGED    Details   donepezil (ARICEPT) 10 mg tablet       losartan-hydroCHLOROthiazide (HYZAAR) 50-12.5 mg per tablet       memantine (NAMENDA) 5 mg tablet Take 5 mg by mouth.      ergocalciferol (VITAMIN D2) 50,000 unit capsule Take 50,000 Units by mouth every seven (7) days. glimepiride (AMARYL) 4 mg tablet Take 4 mg by mouth every morning. simvastatin (ZOCOR) 5 mg tablet Take 5 mg by mouth nightly. ranitidine (ZANTAC) 150 mg tablet Take 150 mg by mouth as needed. LORazepam (ATIVAN) 0.5 mg tablet Take 0.5 mg by mouth two (2) times daily as needed. oxybutynin (DITROPAN) 5 mg tablet Take 5 mg by mouth three (3) times daily. NOTIFY YOUR PHYSICIAN FOR ANY OF THE FOLLOWING:   Fever over 101 degrees for 24 hours. Chest pain, shortness of breath, fever, chills, nausea, vomiting, diarrhea, change in mentation, falling, weakness, bleeding. Severe pain or pain not relieved by medications. Or, any other signs or symptoms that you may have questions about. PHYSICAL EXAMINATION AT DISCHARGE:  General appearance: lethargic, no distress, appears stated age  Head: Normocephalic, without obvious abnormality, atraumatic  Neck: supple, trachea midline  Lungs: clear to auscultation bilaterally  Heart: sinus lucia  Abdomen: soft, non-tender.  Bowel sounds normal. No masses,  no organomegaly  Extremities: extremities normal, atraumatic, no cyanosis or edema  Skin: Skin color, texture, turgor normal. No rashes or lesions  Neurologic: lethargic , nonfocal      CHRONIC MEDICAL DIAGNOSES:  Problem List as of 1/30/2021 Date Reviewed: 9/10/2013          Codes Class Noted - Resolved    Bradycardia ICD-10-CM: R00.1  ICD-9-CM: 427.89  1/28/2021 - Present        Nausea with vomiting ICD-10-CM: R11.2  ICD-9-CM: 787.01  5/18/2014 - Present        * (Principal) Syncope ICD-10-CM: R55  ICD-9-CM: 780.2  5/18/2014 - Present        Altered mental status ICD-10-CM: R41.82  ICD-9-CM: 780.97  5/18/2014 - Present        DM II (diabetes mellitus, type II), controlled ([facility] ICD-10-CM: E11.9  ICD-9-CM: 250.00  5/18/2014 - Present        GI bleed ICD-10-CM: K92.2  ICD-9-CM: 578.9  9/10/2013 - Present              Greater than 35 minutes were spent with the patient on counseling and coordination of care    Signed:   Carollee Rinne, MD  1/30/2021  11:12 AM

## 2021-01-30 NOTE — PROGRESS NOTES
Problem: Falls - Risk of  Goal: *Absence of Falls  Description: Document Sary Jose Fall Risk and appropriate interventions in the flowsheet. Outcome: Progressing Towards Goal  Note: Fall Risk Interventions:  Mobility Interventions: Bed/chair exit alarm, Patient to call before getting OOB, PT Consult for mobility concerns    Mentation Interventions: Adequate sleep, hydration, pain control    Medication Interventions: Bed/chair exit alarm, Patient to call before getting OOB, Teach patient to arise slowly    Elimination Interventions: Bed/chair exit alarm, Call light in reach, Patient to call for help with toileting needs    History of Falls Interventions: Bed/chair exit alarm, Room close to nurse's station         Problem: Patient Education: Go to Patient Education Activity  Goal: Patient/Family Education  Outcome: Progressing Towards Goal     Problem: Pressure Injury - Risk of  Goal: *Prevention of pressure injury  Description: Document Fabian Scale and appropriate interventions in the flowsheet.   Outcome: Progressing Towards Goal  Note: Pressure Injury Interventions:  Sensory Interventions: Assess changes in LOC, Keep linens dry and wrinkle-free, Pressure redistribution bed/mattress (bed type)    Moisture Interventions: Absorbent underpads    Activity Interventions: Pressure redistribution bed/mattress(bed type), PT/OT evaluation    Mobility Interventions: HOB 30 degrees or less, Pressure redistribution bed/mattress (bed type), PT/OT evaluation    Nutrition Interventions: Document food/fluid/supplement intake    Friction and Shear Interventions: Apply protective barrier, creams and emollients                Problem: Pain  Goal: *Control of Pain  Outcome: Progressing Towards Goal     Problem: Patient Education: Go to Patient Education Activity  Goal: Patient/Family Education  Outcome: Progressing Towards Goal     Problem: Discharge Planning  Goal: *Discharge to safe environment  Outcome: Progressing Towards Goal

## 2021-02-01 ENCOUNTER — HOME CARE VISIT (OUTPATIENT)
Dept: SCHEDULING | Facility: HOME HEALTH | Age: 86
End: 2021-02-01
Payer: MEDICARE

## 2021-02-01 ENCOUNTER — HOME CARE VISIT (OUTPATIENT)
Dept: HOME HEALTH SERVICES | Facility: HOME HEALTH | Age: 86
End: 2021-02-01
Payer: MEDICARE

## 2021-02-01 VITALS
SYSTOLIC BLOOD PRESSURE: 116 MMHG | DIASTOLIC BLOOD PRESSURE: 56 MMHG | RESPIRATION RATE: 14 BRPM | TEMPERATURE: 97 F | OXYGEN SATURATION: 95 % | HEART RATE: 51 BPM

## 2021-02-01 PROCEDURE — 400013 HH SOC

## 2021-02-01 PROCEDURE — G0151 HHCP-SERV OF PT,EA 15 MIN: HCPCS

## 2021-02-01 PROCEDURE — G0299 HHS/HOSPICE OF RN EA 15 MIN: HCPCS

## 2021-02-02 ENCOUNTER — HOME CARE VISIT (OUTPATIENT)
Dept: HOME HEALTH SERVICES | Facility: HOME HEALTH | Age: 86
End: 2021-02-02
Payer: MEDICARE

## 2021-02-02 ENCOUNTER — HOME CARE VISIT (OUTPATIENT)
Dept: SCHEDULING | Facility: HOME HEALTH | Age: 86
End: 2021-02-02
Payer: MEDICARE

## 2021-02-02 VITALS
RESPIRATION RATE: 14 BRPM | TEMPERATURE: 97 F | HEART RATE: 51 BPM | OXYGEN SATURATION: 95 % | SYSTOLIC BLOOD PRESSURE: 116 MMHG | DIASTOLIC BLOOD PRESSURE: 56 MMHG

## 2021-02-03 ENCOUNTER — HOME CARE VISIT (OUTPATIENT)
Dept: SCHEDULING | Facility: HOME HEALTH | Age: 86
End: 2021-02-03
Payer: MEDICARE

## 2021-02-03 ENCOUNTER — HOME CARE VISIT (OUTPATIENT)
Dept: HOME HEALTH SERVICES | Facility: HOME HEALTH | Age: 86
End: 2021-02-03
Payer: MEDICARE

## 2021-02-03 VITALS
DIASTOLIC BLOOD PRESSURE: 60 MMHG | HEART RATE: 63 BPM | RESPIRATION RATE: 13 BRPM | SYSTOLIC BLOOD PRESSURE: 124 MMHG | TEMPERATURE: 97.6 F

## 2021-02-03 PROCEDURE — G0299 HHS/HOSPICE OF RN EA 15 MIN: HCPCS

## 2021-02-04 ENCOUNTER — HOME CARE VISIT (OUTPATIENT)
Dept: SCHEDULING | Facility: HOME HEALTH | Age: 86
End: 2021-02-04
Payer: MEDICARE

## 2021-02-04 VITALS
DIASTOLIC BLOOD PRESSURE: 54 MMHG | OXYGEN SATURATION: 91 % | SYSTOLIC BLOOD PRESSURE: 135 MMHG | HEART RATE: 61 BPM | RESPIRATION RATE: 16 BRPM | TEMPERATURE: 97.7 F

## 2021-02-04 PROCEDURE — G0157 HHC PT ASSISTANT EA 15: HCPCS

## 2021-02-05 ENCOUNTER — HOME CARE VISIT (OUTPATIENT)
Dept: SCHEDULING | Facility: HOME HEALTH | Age: 86
End: 2021-02-05
Payer: MEDICARE

## 2021-02-05 PROCEDURE — G0157 HHC PT ASSISTANT EA 15: HCPCS

## 2021-02-08 ENCOUNTER — HOME CARE VISIT (OUTPATIENT)
Dept: SCHEDULING | Facility: HOME HEALTH | Age: 86
End: 2021-02-08
Payer: MEDICARE

## 2021-02-08 VITALS
OXYGEN SATURATION: 94 % | TEMPERATURE: 97.4 F | HEART RATE: 76 BPM | DIASTOLIC BLOOD PRESSURE: 60 MMHG | SYSTOLIC BLOOD PRESSURE: 115 MMHG | RESPIRATION RATE: 16 BRPM

## 2021-02-08 PROCEDURE — G0299 HHS/HOSPICE OF RN EA 15 MIN: HCPCS

## 2021-02-08 PROCEDURE — G0157 HHC PT ASSISTANT EA 15: HCPCS

## 2021-02-08 PROCEDURE — G0152 HHCP-SERV OF OT,EA 15 MIN: HCPCS

## 2021-02-09 ENCOUNTER — HOME CARE VISIT (OUTPATIENT)
Dept: HOME HEALTH SERVICES | Facility: HOME HEALTH | Age: 86
End: 2021-02-09
Payer: MEDICARE

## 2021-02-09 VITALS
TEMPERATURE: 97 F | HEART RATE: 58 BPM | RESPIRATION RATE: 13 BRPM | SYSTOLIC BLOOD PRESSURE: 123 MMHG | DIASTOLIC BLOOD PRESSURE: 52 MMHG | OXYGEN SATURATION: 98 %

## 2021-02-09 VITALS
SYSTOLIC BLOOD PRESSURE: 134 MMHG | DIASTOLIC BLOOD PRESSURE: 72 MMHG | OXYGEN SATURATION: 96 % | TEMPERATURE: 97.5 F | HEART RATE: 77 BPM | RESPIRATION RATE: 17 BRPM

## 2021-02-10 ENCOUNTER — HOME CARE VISIT (OUTPATIENT)
Dept: HOME HEALTH SERVICES | Facility: HOME HEALTH | Age: 86
End: 2021-02-10
Payer: MEDICARE

## 2021-02-11 ENCOUNTER — HOME CARE VISIT (OUTPATIENT)
Dept: HOME HEALTH SERVICES | Facility: HOME HEALTH | Age: 86
End: 2021-02-11
Payer: MEDICARE

## 2021-02-12 ENCOUNTER — HOME CARE VISIT (OUTPATIENT)
Dept: HOME HEALTH SERVICES | Facility: HOME HEALTH | Age: 86
End: 2021-02-12
Payer: MEDICARE

## 2021-02-12 ENCOUNTER — HOME CARE VISIT (OUTPATIENT)
Dept: SCHEDULING | Facility: HOME HEALTH | Age: 86
End: 2021-02-12
Payer: MEDICARE

## 2021-02-15 ENCOUNTER — HOME CARE VISIT (OUTPATIENT)
Dept: SCHEDULING | Facility: HOME HEALTH | Age: 86
End: 2021-02-15
Payer: MEDICARE

## 2021-02-15 VITALS
DIASTOLIC BLOOD PRESSURE: 58 MMHG | SYSTOLIC BLOOD PRESSURE: 123 MMHG | RESPIRATION RATE: 12 BRPM | TEMPERATURE: 97.6 F | HEART RATE: 57 BPM

## 2021-02-15 PROCEDURE — G0299 HHS/HOSPICE OF RN EA 15 MIN: HCPCS

## 2021-02-16 ENCOUNTER — HOME CARE VISIT (OUTPATIENT)
Dept: SCHEDULING | Facility: HOME HEALTH | Age: 86
End: 2021-02-16
Payer: MEDICARE

## 2021-02-16 VITALS
TEMPERATURE: 98.8 F | SYSTOLIC BLOOD PRESSURE: 128 MMHG | OXYGEN SATURATION: 95 % | DIASTOLIC BLOOD PRESSURE: 74 MMHG | HEART RATE: 71 BPM | RESPIRATION RATE: 16 BRPM

## 2021-02-16 PROCEDURE — G0158 HHC OT ASSISTANT EA 15: HCPCS

## 2021-02-18 ENCOUNTER — HOME HEALTH ADMISSION (OUTPATIENT)
Dept: HOME HEALTH SERVICES | Facility: HOME HEALTH | Age: 86
End: 2021-02-18

## 2021-02-19 ENCOUNTER — HOME CARE VISIT (OUTPATIENT)
Dept: SCHEDULING | Facility: HOME HEALTH | Age: 86
End: 2021-02-19
Payer: MEDICARE

## 2021-02-19 VITALS
DIASTOLIC BLOOD PRESSURE: 63 MMHG | SYSTOLIC BLOOD PRESSURE: 132 MMHG | RESPIRATION RATE: 13 BRPM | TEMPERATURE: 96.3 F | HEART RATE: 57 BPM | OXYGEN SATURATION: 94 %

## 2021-02-19 PROCEDURE — G0299 HHS/HOSPICE OF RN EA 15 MIN: HCPCS

## 2021-02-19 PROCEDURE — G0158 HHC OT ASSISTANT EA 15: HCPCS

## 2021-02-21 VITALS
TEMPERATURE: 98.5 F | DIASTOLIC BLOOD PRESSURE: 64 MMHG | HEART RATE: 74 BPM | OXYGEN SATURATION: 96 % | SYSTOLIC BLOOD PRESSURE: 118 MMHG | RESPIRATION RATE: 15 BRPM

## 2021-02-24 ENCOUNTER — HOME CARE VISIT (OUTPATIENT)
Dept: SCHEDULING | Facility: HOME HEALTH | Age: 86
End: 2021-02-24
Payer: MEDICARE

## 2021-02-24 PROCEDURE — G0152 HHCP-SERV OF OT,EA 15 MIN: HCPCS

## 2021-02-25 VITALS
RESPIRATION RATE: 17 BRPM | DIASTOLIC BLOOD PRESSURE: 56 MMHG | HEART RATE: 64 BPM | SYSTOLIC BLOOD PRESSURE: 118 MMHG | OXYGEN SATURATION: 96 % | TEMPERATURE: 98.1 F

## 2022-03-19 PROBLEM — R00.1 BRADYCARDIA: Status: ACTIVE | Noted: 2021-01-28

## 2023-02-23 ENCOUNTER — HOSPITAL ENCOUNTER (EMERGENCY)
Facility: HOSPITAL | Age: 88
Discharge: HOME | End: 2023-02-24
Attending: EMERGENCY MEDICINE
Payer: MEDICARE

## 2023-02-23 VITALS
RESPIRATION RATE: 16 BRPM | SYSTOLIC BLOOD PRESSURE: 163 MMHG | HEART RATE: 64 BPM | DIASTOLIC BLOOD PRESSURE: 88 MMHG | OXYGEN SATURATION: 96 % | TEMPERATURE: 97 F

## 2023-02-23 DIAGNOSIS — S81.812A LACERATION OF LEFT LOWER EXTREMITY, INITIAL ENCOUNTER: Primary | ICD-10-CM

## 2023-02-23 PROCEDURE — 12032 INTMD RPR S/A/T/EXT 2.6-7.5: CPT

## 2023-02-23 PROCEDURE — 99284 EMERGENCY DEPT VISIT MOD MDM: CPT

## 2023-02-23 PROCEDURE — 2500000003 HC RX 250 WO HCPCS

## 2023-02-23 RX ADMIN — LIDOCAINE HYDROCHLORIDE 10 ML: 10; .005 INJECTION, SOLUTION EPIDURAL; INFILTRATION; INTRACAUDAL; PERINEURAL at 22:03

## 2023-02-24 PROCEDURE — 90715 TDAP VACCINE 7 YRS/> IM: CPT

## 2023-02-24 PROCEDURE — 90471 IMMUNIZATION ADMIN: CPT

## 2023-02-24 PROCEDURE — 6360000002 HC RX W HCPCS

## 2023-02-24 RX ADMIN — TETANUS TOXOID, REDUCED DIPHTHERIA TOXOID AND ACELLULAR PERTUSSIS VACCINE, ADSORBED 0.5 ML: 5; 2.5; 8; 8; 2.5 SUSPENSION INTRAMUSCULAR at 01:05

## 2023-02-24 NOTE — DISCHARGE INSTRUCTIONS
Patient should have stitches removed in 7 days by primary care doctor or should return to the ED for suture removal.  Area should be kept dry and clean and any signs of infection such as drainage, wound dehiscence, redness, streaking should return to the ED.

## 2023-02-24 NOTE — ED NOTES
[facility] EMERGENCY DEPT  EMERGENCY DEPARTMENT ENCOUNTER      Pt Name: Den García  MRN: [id number]  [place] 10/18/1925  Date of evaluation: 2/23/2023  Provider: Leo Walters       Chief Complaint   Patient presents with    Laceration         HISTORY OF PRESENT ILLNESS      Den García is a 80 y.o. female with pmh significant for HTN and alzheimer's who presents to the emergency department with a laceration on her L medial shin. History unable to be obtained from pt due to alzheimer's. Per nursing facility, pt was transferred to her wheelchair today and tried to move her legs on her own. She ended up hitting her leg against the underside of her bed, resulting in a laceration with moderate blood loss. Facility is unsure when her most recent tetanus vaccine was last received. The history is provided by the [facility]. The history is limited by the condition of the patient. Nursing Notes were reviewed. REVIEW OF SYSTEMS       Review of Systems   Reason unable to perform ROS: Alzheimer's. Except as noted above the remainder of the review of systems was reviewed and negative. PAST MEDICAL HISTORY     Past Medical History:   Diagnosis Date    Breast cancer ([facility]     Diabetes ([facility]     Hypertension     Hypothyroid          SURGICAL HISTORY       Past Surgical History:   Procedure Laterality Date    APPENDECTOMY      BREAST SURGERY      MASTECTOMY           CURRENT MEDICATIONS       Previous Medications    No medications on file       ALLERGIES     Patient has no known allergies. FAMILY HISTORY     History reviewed. No pertinent family history. SOCIAL HISTORY       Social History     Socioeconomic History    Marital status: Single     Spouse name: None    Number of children: None    Years of education: None    Highest education level: None   Tobacco Use    Smoking status: Never    Smokeless tobacco: Never   Substance and Sexual Activity    Alcohol use: No    Drug use:  No SCREENINGS         [place] Coma Scale  Eye Opening: Spontaneous  Best Verbal Response: Oriented  Best Motor Response: Obeys commands  [place] Coma Scale Score: 15                     CIWA Assessment  BP: (!) 163/88  Heart Rate: 64                 PHYSICAL EXAM      ED Triage Vitals [02/23/23 2039]   BP Temp Temp Source Heart Rate Resp SpO2 Height Weight   (!) 163/88 97 °F (36.1 °C) Axillary 64 16 96 % -- --       Physical Exam  Constitutional:       General: She is not in acute distress. Appearance: She is not ill-appearing or toxic-appearing. HENT:      Head: Normocephalic and atraumatic. Eyes:      Extraocular Movements: Extraocular movements intact. Pupils: Pupils are equal, round, and reactive to light. Cardiovascular:      Rate and Rhythm: Normal rate and regular rhythm. Pulses: Normal pulses. Heart sounds: Normal heart sounds. No murmur heard. No friction rub. No gallop. Pulmonary:      Effort: Pulmonary effort is normal. No respiratory distress. Breath sounds: Normal breath sounds. No wheezing, rhonchi or rales. Abdominal:      General: Abdomen is flat. Bowel sounds are normal.      Palpations: Abdomen is soft. Tenderness: There is no abdominal tenderness. Musculoskeletal:         General: Normal range of motion. Skin:     General: Skin is warm and dry. Findings: Laceration (3 cm x 1 cm laceration present on L medial shin) present. No erythema. Neurological:      Mental Status: She is alert. DIAGNOSTIC RESULTS     EKG: All EKG's are interpreted by the Emergency Department Physician who either signs or Co-signs this chart in the absence of a cardiologist.    Not preformed    RADIOLOGY:   Non-plain film images such as CT, Ultrasound and MRI are read by the radiologist. Plain radiographic images are visualized and preliminarily interpreted by the emergency physician.  Interpretation per the Radiologist below, if available at the time of this note:    No orders to display         ED BEDSIDE ULTRASOUND:   Performed by ED Physician - none    LABS:  Labs Reviewed - No data to display    All other labs were within normal range or not returned as of this dictation. EMERGENCY DEPARTMENT COURSE and DIFFERENTIAL DIAGNOSIS/MDM:   Vitals:    Vitals:    02/23/23 2039   BP: (!) 163/88   Pulse: 64   Resp: 16   Temp: 97 °F (36.1 °C)   TempSrc: Axillary   SpO2: 96%       ED Course:  Initial evaluation performed. Sutures placed and tetanus shot given. Plan to d/c to nursing home and follow up with PCP. Medical Decision Making  80 yro female with pmh significant for HTN and Alzheimer's presenting to the ED for a laceration on her L shin after she hit her leg on the underside of her bed. On physical exam, pt is non-toxic with a 3 cm x 1 cm laceration on her L medial shin. Xray of L leg not ordered due to low index of suspicion for foreign body or fracture. Sutures were placed and tetanus shot was given. Plan d/c to nursing home and follow up with PCP.           CRITICAL CARE TIME     CONSULTS:  None    PROCEDURES:  Unless otherwise noted below, none     Lac Repair    Date/Time: 2/23/2023 10:45 PM  Performed by: Vazquez Olivas MD  Authorized by: Vazquez Olivas MD     Consent:     Consent obtained:  Verbal    Consent given by:  Healthcare agent and patient    Risks, benefits, and alternatives were discussed: yes      Risks discussed:  Infection and poor cosmetic result    Alternatives discussed:  No treatment  Universal protocol:     Procedure explained and questions answered to patient or proxy's satisfaction: yes    Anesthesia:     Anesthesia method:  Local infiltration    Local anesthetic:  Lidocaine 1% WITH epi  Laceration details:     Location:  Leg    Leg location:  L lower leg    Length (cm):  3    Depth (mm):  0.5  Pre-procedure details:     Preparation:  Patient was prepped and draped in usual sterile fashion  Exploration:     Limited defect created (wound extended): no      Hemostasis achieved with:  Epinephrine and direct pressure    Imaging outcome: foreign body not noted      Wound exploration: entire depth of wound visualized      Wound extent: no foreign bodies/material noted, no muscle damage noted, no nerve damage noted and no underlying fracture noted      Contaminated: no    Treatment:     Area cleansed with:  Saline    Amount of cleaning:  Extensive    Irrigation solution:  Sterile saline    Irrigation volume:  60 mL    Irrigation method:  Syringe    Visualized foreign bodies/material removed: no      Debridement:  None    Undermining:  None    Scar revision: no    Skin repair:     Repair method:  Sutures    Suture size:  4-0 and 6-0    Suture material:  Prolene    Suture technique:  Simple interrupted    Number of sutures:  8  Approximation:     Approximation:  Close  Repair type:     Repair type:  Simple  Post-procedure details:     Dressing:  Open (no dressing)    Procedure completion:  Tolerated    FINAL IMPRESSION    No diagnosis found.      DISPOSITION/PLAN   DISPOSITION  Discharged      PATIENT REFERRED TO:  No follow-up provider specified.    DISCHARGE MEDICATIONS:  New Prescriptions    No medications on file     Controlled Substances Monitoring:     No flowsheet data found.    (Please note that portions of this note were completed with a voice recognition program.  Efforts were made to edit the dictations but occasionally words are mis-transcribed.)    Ankita Hutchinson (electronically signed)      *ATTENTION:  This note has been created by a medical student for educational purposes only.  Please do not refer to the content of this note for clinical decision-making, billing, or other purposes.  Please see attending physician’s note to obtain clinical information on this patient.*              Ankita Hutchinson  02/24/23 0028       Ankita Hutchinson  02/24/23 0056

## 2023-02-24 NOTE — ED PROVIDER NOTES
[facility] EMERGENCY DEPT  EMERGENCY DEPARTMENT ENCOUNTER      Pt Name: Loyda Bruce  MRN: [id number]  [place] 10/18/1925  Date of evaluation: 2/23/2023  Provider: Olga Cortez MD    CHIEF COMPLAINT       Chief Complaint   Patient presents with    Laceration         HISTORY OF PRESENT ILLNESS   (Location/Symptom, Timing/Onset, Context/Setting, Quality, Duration, Modifying Factors, Severity)  Note limiting factors. Loyda Bruce is a 80 y.o. female who presents to the emergency department     49-year-old female with dementia presents to the emergency department after having a laceration to her left lower leg. Patient hit her leg against a metal object. No other issues expressed. The history is provided by the EMS personnel. Nursing Notes were reviewed. REVIEW OF SYSTEMS    (2-9 systems for level 4, 10 or more for level 5)     Review of Systems   Unable to perform ROS: Dementia     Except as noted above the remainder of the review of systems was reviewed and negative. PAST MEDICAL HISTORY     Past Medical History:   Diagnosis Date    Breast cancer ([facility] [address]     Diabetes ([facility] [address]     Hypertension     Hypothyroid          SURGICAL HISTORY       Past Surgical History:   Procedure Laterality Date    APPENDECTOMY      BREAST SURGERY      MASTECTOMY           CURRENT MEDICATIONS       Previous Medications    No medications on file       ALLERGIES     Patient has no known allergies. FAMILY HISTORY     History reviewed. No pertinent family history.        SOCIAL HISTORY       Social History     Socioeconomic History    Marital status: Single     Spouse name: None    Number of children: None    Years of education: None    Highest education level: None   Tobacco Use    Smoking status: Never    Smokeless tobacco: Never   Substance and Sexual Activity    Alcohol use: No    Drug use: No       SCREENINGS         [place] Coma Scale  Eye Opening: Spontaneous  Best Verbal Response: Oriented  Best Motor Response: Obeys commands  [place] Coma Scale Score: 15                     CIWA Assessment  BP: (!) 163/88  Heart Rate: 64                 PHYSICAL EXAM    (up to 7 for level 4, 8 or more for level 5)     ED Triage Vitals [02/23/23 2039]   BP Temp Temp Source Heart Rate Resp SpO2 Height Weight   (!) 163/88 97 °F (36.1 °C) Axillary 64 16 96 % -- --       Physical Exam  Vitals reviewed. HENT:      Head: Normocephalic. Nose: Nose normal.   Cardiovascular:      Rate and Rhythm: Normal rate and regular rhythm. Pulmonary:      Effort: Pulmonary effort is normal.   Abdominal:      General: There is no distension. Palpations: Abdomen is soft. There is no mass. Tenderness: There is no abdominal tenderness. There is no right CVA tenderness, left CVA tenderness, guarding or rebound. Hernia: No hernia is present. Musculoskeletal:         General: Tenderness present. No swelling, deformity or signs of injury. Cervical back: Normal range of motion. Right lower leg: No edema. Left lower leg: No edema. Skin:     Capillary Refill: Capillary refill takes less than 2 seconds. Comments: 7 cm laceration mid left lower leg no signs of infection no active bleeding   Neurological:      General: No focal deficit present. Mental Status: She is alert.    Psychiatric:         Mood and Affect: Mood normal.       DIAGNOSTIC RESULTS     EKG: All EKG's are interpreted by the Emergency Department Physician who either signs or Co-signs this chart in the absence of a cardiologist.        RADIOLOGY:   Non-plain film images such as CT, Ultrasound and MRI are read by the radiologist. Plain radiographic images are visualized and preliminarily interpreted by the emergency physician with the below findings:        Interpretation per the Radiologist below, if available at the time of this note:    No orders to display         ED BEDSIDE ULTRASOUND:   Performed by ED Physician - none    LABS:  Labs Reviewed - No data to display    All other labs were within normal range or not returned as of this dictation. EMERGENCY DEPARTMENT COURSE and DIFFERENTIAL DIAGNOSIS/MDM:   Vitals:    Vitals:    02/23/23 2039   BP: (!) 163/88   Pulse: 64   Resp: 16   Temp: 97 °F (36.1 °C)   TempSrc: Axillary   SpO2: 96%           Medical Decision Making  40-year-old female presents emergency department laceration to leg. We updated her tetanus. Patient had a clean and sterile technique used 5 cc of 1% lidocaine with epi.   sutures were placed single interrupted. Use four-point 0 Ethilon. Patient tolerated procedure well. REASSESSMENT          CRITICAL CARE TIME     CONSULTS:  None    PROCEDURES:  Unless otherwise noted below, none     Procedures        FINAL IMPRESSION    No diagnosis found. DISPOSITION/PLAN   DISPOSITION        PATIENT REFERRED TO:  No follow-up provider specified. DISCHARGE MEDICATIONS:  New Prescriptions    No medications on file     Controlled Substances Monitoring:     No flowsheet data found. (Please note that portions of this note were completed with a voice recognition program.  Efforts were made to edit the dictations but occasionally words are mis-transcribed. )    Karis Hurst MD (electronically signed)  Attending Emergency Physician           Eugenio Thompson MD  02/24/23 1118

## 2023-05-12 RX ORDER — RANITIDINE 150 MG/1
TABLET ORAL PRN
COMMUNITY

## 2023-05-12 RX ORDER — GLIMEPIRIDE 4 MG/1
TABLET ORAL
COMMUNITY

## 2023-05-12 RX ORDER — LORAZEPAM 0.5 MG/1
TABLET ORAL 2 TIMES DAILY PRN
COMMUNITY

## 2023-05-12 RX ORDER — LEVOTHYROXINE SODIUM 0.12 MG/1
TABLET ORAL
COMMUNITY
Start: 2021-01-31

## 2023-05-12 RX ORDER — ERGOCALCIFEROL 1.25 MG/1
CAPSULE ORAL
COMMUNITY

## 2023-05-12 RX ORDER — SIMVASTATIN 5 MG
5 TABLET ORAL NIGHTLY
COMMUNITY

## 2023-05-12 RX ORDER — AMLODIPINE BESYLATE 10 MG/1
TABLET ORAL DAILY
COMMUNITY
Start: 2021-01-30

## 2023-05-12 RX ORDER — OXYBUTYNIN CHLORIDE 5 MG/1
TABLET ORAL 3 TIMES DAILY
COMMUNITY

## 2023-05-12 RX ORDER — CEPHALEXIN 500 MG/1
CAPSULE ORAL 4 TIMES DAILY
COMMUNITY
Start: 2021-01-30

## 2023-05-12 RX ORDER — MEMANTINE HYDROCHLORIDE 5 MG/1
5 TABLET ORAL
COMMUNITY
Start: 2016-05-03

## 2023-05-12 RX ORDER — LOSARTAN POTASSIUM 50 MG/1
50 TABLET ORAL DAILY
COMMUNITY

## 2023-05-12 RX ORDER — LOSARTAN POTASSIUM AND HYDROCHLOROTHIAZIDE 12.5; 5 MG/1; MG/1
TABLET ORAL
COMMUNITY
Start: 2019-09-16

## 2023-05-12 RX ORDER — DONEPEZIL HYDROCHLORIDE 10 MG/1
10 TABLET, FILM COATED ORAL NIGHTLY
COMMUNITY
Start: 2019-10-14

## 2023-05-12 RX ORDER — HYDROCHLOROTHIAZIDE 12.5 MG/1
12.5 TABLET ORAL DAILY
COMMUNITY

## 2023-09-27 ENCOUNTER — APPOINTMENT (OUTPATIENT)
Facility: HOSPITAL | Age: 88
End: 2023-09-27
Payer: MEDICARE

## 2023-09-27 ENCOUNTER — HOSPITAL ENCOUNTER (EMERGENCY)
Facility: HOSPITAL | Age: 88
Discharge: NURSING, REHAB OR LONG-TERM CARE | End: 2023-09-28
Attending: EMERGENCY MEDICINE
Payer: MEDICARE

## 2023-09-27 DIAGNOSIS — S09.90XA INJURY OF HEAD, INITIAL ENCOUNTER: ICD-10-CM

## 2023-09-27 DIAGNOSIS — N39.0 URINARY TRACT INFECTION WITHOUT HEMATURIA, SITE UNSPECIFIED: ICD-10-CM

## 2023-09-27 DIAGNOSIS — S01.01XA LACERATION OF SCALP, INITIAL ENCOUNTER: Primary | ICD-10-CM

## 2023-09-27 LAB
ANION GAP SERPL CALC-SCNC: 6 MMOL/L (ref 3–18)
APPEARANCE UR: CLEAR
BACTERIA URNS QL MICRO: ABNORMAL /HPF
BASOPHILS # BLD: 0 K/UL (ref 0–0.1)
BASOPHILS NFR BLD: 0 % (ref 0–2)
BILIRUB UR QL: NEGATIVE
BUN SERPL-MCNC: 28 MG/DL (ref 7–18)
BUN/CREAT SERPL: 29 (ref 12–20)
CALCIUM SERPL-MCNC: 9.1 MG/DL (ref 8.5–10.1)
CHLORIDE SERPL-SCNC: 103 MMOL/L (ref 100–111)
CO2 SERPL-SCNC: 28 MMOL/L (ref 21–32)
COLOR UR: YELLOW
CREAT SERPL-MCNC: 0.96 MG/DL (ref 0.6–1.3)
DIFFERENTIAL METHOD BLD: ABNORMAL
EOSINOPHIL # BLD: 0.1 K/UL (ref 0–0.4)
EOSINOPHIL NFR BLD: 2 % (ref 0–5)
EPITH CASTS URNS QL MICRO: ABNORMAL /LPF (ref 0–5)
ERYTHROCYTE [DISTWIDTH] IN BLOOD BY AUTOMATED COUNT: 15.2 % (ref 11.6–14.5)
GLUCOSE SERPL-MCNC: 141 MG/DL (ref 74–99)
GLUCOSE UR STRIP.AUTO-MCNC: NEGATIVE MG/DL
HCT VFR BLD AUTO: 34.2 % (ref 35–45)
HGB BLD-MCNC: 11.1 G/DL (ref 12–16)
HGB UR QL STRIP: ABNORMAL
IMM GRANULOCYTES # BLD AUTO: 0.1 K/UL (ref 0–0.04)
IMM GRANULOCYTES NFR BLD AUTO: 1 % (ref 0–0.5)
KETONES UR QL STRIP.AUTO: NEGATIVE MG/DL
LEUKOCYTE ESTERASE UR QL STRIP.AUTO: ABNORMAL
LYMPHOCYTES # BLD: 2.1 K/UL (ref 0.9–3.6)
LYMPHOCYTES NFR BLD: 23 % (ref 21–52)
MAGNESIUM SERPL-MCNC: 2.3 MG/DL (ref 1.6–2.6)
MCH RBC QN AUTO: 29.3 PG (ref 24–34)
MCHC RBC AUTO-ENTMCNC: 32.5 G/DL (ref 31–37)
MCV RBC AUTO: 90.2 FL (ref 78–100)
MONOCYTES # BLD: 1 K/UL (ref 0.05–1.2)
MONOCYTES NFR BLD: 11 % (ref 3–10)
NEUTS SEG # BLD: 5.8 K/UL (ref 1.8–8)
NEUTS SEG NFR BLD: 64 % (ref 40–73)
NITRITE UR QL STRIP.AUTO: NEGATIVE
NRBC # BLD: 0 K/UL (ref 0–0.01)
NRBC BLD-RTO: 0 PER 100 WBC
NT PRO BNP: 734 PG/ML (ref 0–1800)
PH UR STRIP: 5 (ref 5–8)
PHOSPHATE SERPL-MCNC: 3.2 MG/DL (ref 2.5–4.9)
PLATELET # BLD AUTO: 284 K/UL (ref 135–420)
PMV BLD AUTO: 10.1 FL (ref 9.2–11.8)
POTASSIUM SERPL-SCNC: 4.3 MMOL/L (ref 3.5–5.5)
PROT UR STRIP-MCNC: 30 MG/DL
RBC # BLD AUTO: 3.79 M/UL (ref 4.2–5.3)
RBC #/AREA URNS HPF: ABNORMAL /HPF (ref 0–5)
SODIUM SERPL-SCNC: 137 MMOL/L (ref 136–145)
SP GR UR REFRACTOMETRY: 1.01 (ref 1–1.03)
TROPONIN I SERPL HS-MCNC: 105 NG/L (ref 0–54)
TROPONIN I SERPL HS-MCNC: 109 NG/L (ref 0–54)
TROPONIN I SERPL HS-MCNC: 111 NG/L (ref 0–54)
TSH SERPL DL<=0.05 MIU/L-ACNC: 2.84 UIU/ML (ref 0.36–3.74)
UROBILINOGEN UR QL STRIP.AUTO: 0.2 EU/DL (ref 0.2–1)
WBC # BLD AUTO: 9.2 K/UL (ref 4.6–13.2)
WBC URNS QL MICRO: ABNORMAL /HPF (ref 0–4)

## 2023-09-27 PROCEDURE — 93005 ELECTROCARDIOGRAM TRACING: CPT | Performed by: EMERGENCY MEDICINE

## 2023-09-27 PROCEDURE — 80048 BASIC METABOLIC PNL TOTAL CA: CPT

## 2023-09-27 PROCEDURE — 71046 X-RAY EXAM CHEST 2 VIEWS: CPT

## 2023-09-27 PROCEDURE — 83735 ASSAY OF MAGNESIUM: CPT

## 2023-09-27 PROCEDURE — 12002 RPR S/N/AX/GEN/TRNK2.6-7.5CM: CPT

## 2023-09-27 PROCEDURE — 6370000000 HC RX 637 (ALT 250 FOR IP): Performed by: EMERGENCY MEDICINE

## 2023-09-27 PROCEDURE — 99285 EMERGENCY DEPT VISIT HI MDM: CPT

## 2023-09-27 PROCEDURE — 84100 ASSAY OF PHOSPHORUS: CPT

## 2023-09-27 PROCEDURE — 84484 ASSAY OF TROPONIN QUANT: CPT

## 2023-09-27 PROCEDURE — 72125 CT NECK SPINE W/O DYE: CPT

## 2023-09-27 PROCEDURE — 84443 ASSAY THYROID STIM HORMONE: CPT

## 2023-09-27 PROCEDURE — 85025 COMPLETE CBC W/AUTO DIFF WBC: CPT

## 2023-09-27 PROCEDURE — 83880 ASSAY OF NATRIURETIC PEPTIDE: CPT

## 2023-09-27 PROCEDURE — 70450 CT HEAD/BRAIN W/O DYE: CPT

## 2023-09-27 PROCEDURE — 81001 URINALYSIS AUTO W/SCOPE: CPT

## 2023-09-27 RX ORDER — CEPHALEXIN 250 MG/1
500 CAPSULE ORAL ONCE
Status: COMPLETED | OUTPATIENT
Start: 2023-09-27 | End: 2023-09-27

## 2023-09-27 RX ORDER — CEPHALEXIN 500 MG/1
500 CAPSULE ORAL 2 TIMES DAILY
Qty: 14 CAPSULE | Refills: 0 | Status: SHIPPED | OUTPATIENT
Start: 2023-09-27 | End: 2023-10-04

## 2023-09-27 RX ADMIN — CEPHALEXIN 500 MG: 250 CAPSULE ORAL at 19:49

## 2023-09-27 ASSESSMENT — PAIN - FUNCTIONAL ASSESSMENT: PAIN_FUNCTIONAL_ASSESSMENT: 0-10

## 2023-09-27 ASSESSMENT — LIFESTYLE VARIABLES
HOW MANY STANDARD DRINKS CONTAINING ALCOHOL DO YOU HAVE ON A TYPICAL DAY: PATIENT DOES NOT DRINK
HOW OFTEN DO YOU HAVE A DRINK CONTAINING ALCOHOL: NEVER

## 2023-09-27 NOTE — ED PROVIDER NOTES
[facility] EMERGENCY DEPT  EMERGENCY DEPARTMENT ENCOUNTER    Patient Name: Argelia Hedrick  MRN: [id number]  YOB: 1925  Provider: Theresa White DO  PCP: Sulema Knox MD   Time/Date of evaluation: 4:32 PM EDT on 9/27/23    History of Presenting Illness     History Provided by: Patient  History is limited by: Nothing     HISTORY:   Argelia Hedrick is a 80 y.o. female brought in by EMS from [address] [place] [address] for evaluation of head injury. EMS technician reports that the patient fell out of her bed. Patient sustained a hematoma and superficial laceration to her right parietal scalp area. Review of the patient's nursing home documents revealed that she has a history of essential hypertension, chronic systolic congestive heart failure, type 2 diabetes, chronic kidney disease stage III, unspecified thyroid disorder, and Alzheimer's disease. Nursing Notes were all reviewed and agreed with or any disagreements were addressed in the HPI. Past History     PAST MEDICAL HISTORY:  Past Medical History:   Diagnosis Date    Breast cancer ([address])     Diabetes ([address])     Hypertension     Hypothyroid        PAST SURGICAL HISTORY:  Past Surgical History:   Procedure Laterality Date    APPENDECTOMY      BREAST SURGERY      MASTECTOMY         FAMILY HISTORY:  No family history on file. SOCIAL HISTORY:  Social History     Tobacco Use    Smoking status: Never    Smokeless tobacco: Never   Substance Use Topics    Alcohol use: No    Drug use: No       MEDICATIONS:  No current facility-administered medications for this encounter.      Current Outpatient Medications   Medication Sig Dispense Refill    amLODIPine (NORVASC) 10 MG tablet Take by mouth daily      cephALEXin (KEFLEX) 500 MG capsule Take by mouth 4 times daily      donepezil (ARICEPT) 10 MG tablet Take 1 tablet by mouth nightly      ergocalciferol (ERGOCALCIFEROL) 1.25 MG (34617 UT) capsule Take by mouth every 7 days      glimepiride (AMARYL)   (Electronically signed)            Regina Elliott DO  09/27/23 8024

## 2023-09-27 NOTE — ED PROVIDER NOTES
Emergency Department Encounter  Location: [facility] EMERGENCY DEPT  Open Note Time:  8:41 PM    Patient: Segundo Loving  MRN: [id number]  : 10/18/1925  Date of evaluation: 2023  ED Provider: Litzy Leigh MD      Segundo Piñar was checked out to me by Dr. Saul Kennedy. Please see his/her initial documentation for details of the patient's initial ED presentation, physical exam and completed studies. In brief, Segundo Loving is a 80 y.o. female who presented to the emergency department for fall. Current studies pending and/or plan: CT C-spine, repeat trop, U/A    I wore goggles, surgical mask, N95 mask and gloves when I evaluated the patient. Physical Exam  Constitutional:       General: She is not in acute distress. Appearance: Normal appearance. She is underweight. She is not ill-appearing or toxic-appearing. Cardiovascular:      Rate and Rhythm: Normal rate and regular rhythm. Pulses: Normal pulses. Pulmonary:      Effort: Pulmonary effort is normal. No accessory muscle usage, respiratory distress or retractions. Breath sounds: Normal breath sounds. No stridor. Chest:      Chest wall: No tenderness. Abdominal:      General: There is no distension. Palpations: Abdomen is soft. Tenderness: There is no abdominal tenderness. Neurological:      Mental Status: She is alert. Mental status is at baseline. She is disoriented and confused. GCS: GCS eye subscore is 4. GCS verbal subscore is 5. GCS motor subscore is 6. Psychiatric:         Behavior: Behavior is cooperative.          I have reviewed and interpreted all of the currently available lab results and diagnostics from this visit:      Procedures/interventions/images ordered for this visit  Orders Placed This Encounter   Procedures    LACERATION REPAIR    CT Head W/O Contrast    CT CSpine W/O Contrast    XR CHEST (2 VW)    CBC with Auto Differential    BMP    Troponin    Brain Natriuretic Peptide    Magnesium

## 2023-09-27 NOTE — ED NOTES
Cleaned dried blood from R side of head and face. Noted with small wound at R parietal, bleeding controlled. Pt is alert ad oriented to self only. Taken to CT by transport.         An Souza RN  09/27/23 2605

## 2023-09-27 NOTE — ED TRIAGE NOTES
Patient arrived via EMS from [facility]. Patient has a hematoma to  parietal side of head.  Patient takes aspirin no blood thinners

## 2023-09-28 VITALS
SYSTOLIC BLOOD PRESSURE: 120 MMHG | WEIGHT: 126.4 LBS | DIASTOLIC BLOOD PRESSURE: 86 MMHG | HEART RATE: 80 BPM | RESPIRATION RATE: 16 BRPM | HEIGHT: 60 IN | BODY MASS INDEX: 24.81 KG/M2 | TEMPERATURE: 97.3 F | OXYGEN SATURATION: 98 %

## 2023-09-28 LAB
EKG ATRIAL RATE: 66 BPM
EKG DIAGNOSIS: NORMAL
EKG P AXIS: 73 DEGREES
EKG P-R INTERVAL: 148 MS
EKG Q-T INTERVAL: 456 MS
EKG QRS DURATION: 154 MS
EKG QTC CALCULATION (BAZETT): 478 MS
EKG R AXIS: 37 DEGREES
EKG T AXIS: 197 DEGREES
EKG VENTRICULAR RATE: 66 BPM

## 2023-09-28 PROCEDURE — 93010 ELECTROCARDIOGRAM REPORT: CPT | Performed by: INTERNAL MEDICINE

## 2023-09-28 NOTE — ED NOTES
Patient is discharged at this time , patient has been given discharge instructions. D/C paperwork and been explained and questions have been answered at this time. IV removed at this time.        Catherine Umanzor RN  09/28/23 4813

## 2023-09-28 NOTE — ED NOTES
Patient is in bed , resting with eyes open. Patient pulled out IV. Patient redirected. Patient has new IV placed at  this time.       Jojo Escobar RN  09/27/23 2023

## 2023-09-28 NOTE — ED NOTES
Patient removing all of her vital sign monitoring equipment. Patient difficult to orient.       Esperanza Wallace RN  09/28/23 5763

## 2023-09-28 NOTE — ED NOTES
PLEASE CALL [facility]  WITH CULTURE AND SENSITIVITY RESULTS   CLINICAL FAX NUMBER [phone]     Leni Goodwin  09/27/23 2048

## 2024-01-01 ENCOUNTER — HOME CARE VISIT (OUTPATIENT)
Age: 89
End: 2024-01-01
Payer: MEDICARE

## 2024-01-01 VITALS
DIASTOLIC BLOOD PRESSURE: 55 MMHG | TEMPERATURE: 100.2 F | OXYGEN SATURATION: 90 % | SYSTOLIC BLOOD PRESSURE: 115 MMHG | HEART RATE: 100 BPM

## 2024-01-01 PROCEDURE — G0299 HHS/HOSPICE OF RN EA 15 MIN: HCPCS

## 2024-01-01 ASSESSMENT — ENCOUNTER SYMPTOMS
CONSTIPATION: 1
BOWEL INCONTINENCE: 1

## 2024-04-21 ENCOUNTER — APPOINTMENT (OUTPATIENT)
Facility: HOSPITAL | Age: 89
End: 2024-04-21
Payer: MEDICARE

## 2024-04-21 ENCOUNTER — HOSPITAL ENCOUNTER (INPATIENT)
Facility: HOSPITAL | Age: 89
LOS: 4 days | Discharge: NURSING, REHAB OR LONG-TERM CARE | End: 2024-04-25
Attending: STUDENT IN AN ORGANIZED HEALTH CARE EDUCATION/TRAINING PROGRAM | Admitting: INTERNAL MEDICINE
Payer: MEDICARE

## 2024-04-21 DIAGNOSIS — S72.8X1A OTHER FRACTURE OF RIGHT FEMUR, INITIAL ENCOUNTER FOR CLOSED FRACTURE (HCC): Primary | ICD-10-CM

## 2024-04-21 DIAGNOSIS — D72.829 LEUKOCYTOSIS, UNSPECIFIED TYPE: ICD-10-CM

## 2024-04-21 DIAGNOSIS — Z51.5 HOSPICE CARE PATIENT: ICD-10-CM

## 2024-04-21 DIAGNOSIS — D50.0 BLOOD LOSS ANEMIA: ICD-10-CM

## 2024-04-21 DIAGNOSIS — R81 GLUCOSURIA: ICD-10-CM

## 2024-04-21 DIAGNOSIS — R73.9 HYPERGLYCEMIA: ICD-10-CM

## 2024-04-21 PROBLEM — I50.20 HFREF (HEART FAILURE WITH REDUCED EJECTION FRACTION) (HCC): Status: ACTIVE | Noted: 2024-04-21

## 2024-04-21 PROBLEM — Z85.3 HISTORY OF BREAST CANCER: Status: ACTIVE | Noted: 2024-04-21

## 2024-04-21 PROBLEM — Z85.3 HISTORY OF BREAST CANCER: Chronic | Status: ACTIVE | Noted: 2024-04-21

## 2024-04-21 PROBLEM — I50.20 HFREF (HEART FAILURE WITH REDUCED EJECTION FRACTION) (HCC): Chronic | Status: ACTIVE | Noted: 2024-04-21

## 2024-04-21 LAB
ABO + RH BLD: NORMAL
ALBUMIN SERPL-MCNC: 2.6 G/DL (ref 3.4–5)
ALBUMIN/GLOB SERPL: 0.6 (ref 0.8–1.7)
ALP SERPL-CCNC: 109 U/L (ref 45–117)
ALT SERPL-CCNC: 18 U/L (ref 13–56)
ANION GAP SERPL CALC-SCNC: 5 MMOL/L (ref 3–18)
APPEARANCE UR: CLEAR
AST SERPL-CCNC: 31 U/L (ref 10–38)
BACTERIA URNS QL MICRO: ABNORMAL /HPF
BASOPHILS # BLD: 0 K/UL (ref 0–0.1)
BASOPHILS NFR BLD: 0 % (ref 0–2)
BILIRUB SERPL-MCNC: 0.5 MG/DL (ref 0.2–1)
BILIRUB UR QL: NEGATIVE
BLOOD GROUP ANTIBODIES SERPL: NORMAL
BUN SERPL-MCNC: 23 MG/DL (ref 7–18)
BUN/CREAT SERPL: 21 (ref 12–20)
CALCIUM SERPL-MCNC: 8.7 MG/DL (ref 8.5–10.1)
CHLORIDE SERPL-SCNC: 103 MMOL/L (ref 100–111)
CO2 SERPL-SCNC: 30 MMOL/L (ref 21–32)
COLOR UR: YELLOW
CREAT SERPL-MCNC: 1.12 MG/DL (ref 0.6–1.3)
DIFFERENTIAL METHOD BLD: ABNORMAL
EOSINOPHIL # BLD: 0 K/UL (ref 0–0.4)
EOSINOPHIL NFR BLD: 0 % (ref 0–5)
EPITH CASTS URNS QL MICRO: ABNORMAL /LPF (ref 0–5)
ERYTHROCYTE [DISTWIDTH] IN BLOOD BY AUTOMATED COUNT: 14.3 % (ref 11.6–14.5)
GLOBULIN SER CALC-MCNC: 4.6 G/DL (ref 2–4)
GLUCOSE BLD STRIP.AUTO-MCNC: 176 MG/DL (ref 70–110)
GLUCOSE BLD STRIP.AUTO-MCNC: 212 MG/DL (ref 70–110)
GLUCOSE SERPL-MCNC: 349 MG/DL (ref 74–99)
GLUCOSE UR STRIP.AUTO-MCNC: >1000 MG/DL
HCT VFR BLD AUTO: 27.9 % (ref 35–45)
HGB BLD-MCNC: 8.7 G/DL (ref 12–16)
HGB UR QL STRIP: NEGATIVE
IMM GRANULOCYTES # BLD AUTO: 0.2 K/UL (ref 0–0.04)
IMM GRANULOCYTES NFR BLD AUTO: 1 % (ref 0–0.5)
INR PPP: 1.2 (ref 0.9–1.1)
KETONES UR QL STRIP.AUTO: NEGATIVE MG/DL
LEUKOCYTE ESTERASE UR QL STRIP.AUTO: NEGATIVE
LYMPHOCYTES # BLD: 1.5 K/UL (ref 0.9–3.6)
LYMPHOCYTES NFR BLD: 10 % (ref 21–52)
MAGNESIUM SERPL-MCNC: 2 MG/DL (ref 1.6–2.6)
MCH RBC QN AUTO: 29.2 PG (ref 24–34)
MCHC RBC AUTO-ENTMCNC: 31.2 G/DL (ref 31–37)
MCV RBC AUTO: 93.6 FL (ref 78–100)
MONOCYTES # BLD: 3.5 K/UL (ref 0.05–1.2)
MONOCYTES NFR BLD: 23 % (ref 3–10)
NEUTS SEG # BLD: 9.9 K/UL (ref 1.8–8)
NEUTS SEG NFR BLD: 66 % (ref 40–73)
NITRITE UR QL STRIP.AUTO: NEGATIVE
NRBC # BLD: 0 K/UL (ref 0–0.01)
NRBC BLD-RTO: 0 PER 100 WBC
PH UR STRIP: 5 (ref 5–8)
PLATELET # BLD AUTO: 234 K/UL (ref 135–420)
PMV BLD AUTO: 10.1 FL (ref 9.2–11.8)
POTASSIUM SERPL-SCNC: 4 MMOL/L (ref 3.5–5.5)
PROT SERPL-MCNC: 7.2 G/DL (ref 6.4–8.2)
PROT UR STRIP-MCNC: 100 MG/DL
PROTHROMBIN TIME: 14.9 SEC (ref 11.9–14.7)
RBC # BLD AUTO: 2.98 M/UL (ref 4.2–5.3)
RBC #/AREA URNS HPF: ABNORMAL /HPF (ref 0–5)
SODIUM SERPL-SCNC: 138 MMOL/L (ref 136–145)
SP GR UR REFRACTOMETRY: 1.03 (ref 1–1.03)
SPECIMEN EXP DATE BLD: NORMAL
UROBILINOGEN UR QL STRIP.AUTO: 1 EU/DL (ref 0.2–1)
WBC # BLD AUTO: 15.1 K/UL (ref 4.6–13.2)
WBC URNS QL MICRO: ABNORMAL /HPF (ref 0–4)

## 2024-04-21 PROCEDURE — 2580000003 HC RX 258: Performed by: PHYSICIAN ASSISTANT

## 2024-04-21 PROCEDURE — 99223 1ST HOSP IP/OBS HIGH 75: CPT | Performed by: PHYSICIAN ASSISTANT

## 2024-04-21 PROCEDURE — 93005 ELECTROCARDIOGRAM TRACING: CPT | Performed by: INTERNAL MEDICINE

## 2024-04-21 PROCEDURE — 71045 X-RAY EXAM CHEST 1 VIEW: CPT

## 2024-04-21 PROCEDURE — 99285 EMERGENCY DEPT VISIT HI MDM: CPT

## 2024-04-21 PROCEDURE — 6360000002 HC RX W HCPCS: Performed by: PHYSICIAN ASSISTANT

## 2024-04-21 PROCEDURE — 85025 COMPLETE CBC W/AUTO DIFF WBC: CPT

## 2024-04-21 PROCEDURE — 86900 BLOOD TYPING SEROLOGIC ABO: CPT

## 2024-04-21 PROCEDURE — 86850 RBC ANTIBODY SCREEN: CPT

## 2024-04-21 PROCEDURE — 73552 X-RAY EXAM OF FEMUR 2/>: CPT

## 2024-04-21 PROCEDURE — 96374 THER/PROPH/DIAG INJ IV PUSH: CPT

## 2024-04-21 PROCEDURE — 96375 TX/PRO/DX INJ NEW DRUG ADDON: CPT

## 2024-04-21 PROCEDURE — 85610 PROTHROMBIN TIME: CPT

## 2024-04-21 PROCEDURE — 81001 URINALYSIS AUTO W/SCOPE: CPT

## 2024-04-21 PROCEDURE — 1100000000 HC RM PRIVATE

## 2024-04-21 PROCEDURE — 80053 COMPREHEN METABOLIC PANEL: CPT

## 2024-04-21 PROCEDURE — 6370000000 HC RX 637 (ALT 250 FOR IP): Performed by: PHYSICIAN ASSISTANT

## 2024-04-21 PROCEDURE — 86901 BLOOD TYPING SEROLOGIC RH(D): CPT

## 2024-04-21 PROCEDURE — 82962 GLUCOSE BLOOD TEST: CPT

## 2024-04-21 PROCEDURE — 73560 X-RAY EXAM OF KNEE 1 OR 2: CPT

## 2024-04-21 PROCEDURE — 6360000002 HC RX W HCPCS: Performed by: STUDENT IN AN ORGANIZED HEALTH CARE EDUCATION/TRAINING PROGRAM

## 2024-04-21 PROCEDURE — 72170 X-RAY EXAM OF PELVIS: CPT

## 2024-04-21 PROCEDURE — 83735 ASSAY OF MAGNESIUM: CPT

## 2024-04-21 RX ORDER — LEVOTHYROXINE SODIUM 0.07 MG/1
75 TABLET ORAL DAILY
Status: DISCONTINUED | OUTPATIENT
Start: 2024-04-22 | End: 2024-04-25 | Stop reason: HOSPADM

## 2024-04-21 RX ORDER — ACETAMINOPHEN 650 MG/1
650 SUPPOSITORY RECTAL EVERY 6 HOURS PRN
Status: DISCONTINUED | OUTPATIENT
Start: 2024-04-21 | End: 2024-04-25 | Stop reason: HOSPADM

## 2024-04-21 RX ORDER — SERTRALINE HYDROCHLORIDE 25 MG/1
25 TABLET, FILM COATED ORAL 2 TIMES DAILY
Status: DISCONTINUED | OUTPATIENT
Start: 2024-04-21 | End: 2024-04-25 | Stop reason: HOSPADM

## 2024-04-21 RX ORDER — MORPHINE SULFATE 4 MG/ML
4 INJECTION, SOLUTION INTRAMUSCULAR; INTRAVENOUS
Status: COMPLETED | OUTPATIENT
Start: 2024-04-21 | End: 2024-04-21

## 2024-04-21 RX ORDER — SODIUM CHLORIDE 9 MG/ML
INJECTION, SOLUTION INTRAVENOUS PRN
Status: DISCONTINUED | OUTPATIENT
Start: 2024-04-21 | End: 2024-04-23 | Stop reason: SDUPTHER

## 2024-04-21 RX ORDER — FAMOTIDINE 20 MG/1
10 TABLET, FILM COATED ORAL DAILY
Status: DISCONTINUED | OUTPATIENT
Start: 2024-04-22 | End: 2024-04-23 | Stop reason: SDUPTHER

## 2024-04-21 RX ORDER — ACETAMINOPHEN 325 MG/1
650 TABLET ORAL EVERY 6 HOURS PRN
Status: DISCONTINUED | OUTPATIENT
Start: 2024-04-21 | End: 2024-04-23 | Stop reason: SDUPTHER

## 2024-04-21 RX ORDER — LEVOTHYROXINE SODIUM 0.07 MG/1
75 TABLET ORAL DAILY
COMMUNITY

## 2024-04-21 RX ORDER — LIDOCAINE 50 MG/G
1 PATCH TOPICAL DAILY
Status: ON HOLD | COMMUNITY
End: 2024-04-24 | Stop reason: HOSPADM

## 2024-04-21 RX ORDER — HYDROXYZINE HYDROCHLORIDE 25 MG/1
25 TABLET, FILM COATED ORAL DAILY
COMMUNITY

## 2024-04-21 RX ORDER — ONDANSETRON 2 MG/ML
4 INJECTION INTRAMUSCULAR; INTRAVENOUS EVERY 6 HOURS PRN
Status: DISCONTINUED | OUTPATIENT
Start: 2024-04-21 | End: 2024-04-23 | Stop reason: SDUPTHER

## 2024-04-21 RX ORDER — FAMOTIDINE 20 MG/1
20 TABLET, FILM COATED ORAL ONCE
COMMUNITY

## 2024-04-21 RX ORDER — SERTRALINE HYDROCHLORIDE 25 MG/1
25 TABLET, FILM COATED ORAL 2 TIMES DAILY
COMMUNITY

## 2024-04-21 RX ORDER — SODIUM CHLORIDE 0.9 % (FLUSH) 0.9 %
5-40 SYRINGE (ML) INJECTION PRN
Status: DISCONTINUED | OUTPATIENT
Start: 2024-04-21 | End: 2024-04-25 | Stop reason: HOSPADM

## 2024-04-21 RX ORDER — ONDANSETRON 4 MG/1
4 TABLET, ORALLY DISINTEGRATING ORAL EVERY 8 HOURS PRN
Status: DISCONTINUED | OUTPATIENT
Start: 2024-04-21 | End: 2024-04-23 | Stop reason: SDUPTHER

## 2024-04-21 RX ORDER — MORPHINE SULFATE 2 MG/ML
1 INJECTION, SOLUTION INTRAMUSCULAR; INTRAVENOUS EVERY 4 HOURS PRN
Status: DISCONTINUED | OUTPATIENT
Start: 2024-04-21 | End: 2024-04-25 | Stop reason: HOSPADM

## 2024-04-21 RX ORDER — INSULIN LISPRO 100 [IU]/ML
0-4 INJECTION, SOLUTION INTRAVENOUS; SUBCUTANEOUS NIGHTLY
Status: DISCONTINUED | OUTPATIENT
Start: 2024-04-21 | End: 2024-04-21

## 2024-04-21 RX ORDER — INSULIN LISPRO 100 [IU]/ML
0-4 INJECTION, SOLUTION INTRAVENOUS; SUBCUTANEOUS
Status: DISCONTINUED | OUTPATIENT
Start: 2024-04-21 | End: 2024-04-21

## 2024-04-21 RX ORDER — ENOXAPARIN SODIUM 100 MG/ML
30 INJECTION SUBCUTANEOUS DAILY
Status: DISCONTINUED | OUTPATIENT
Start: 2024-04-21 | End: 2024-04-21

## 2024-04-21 RX ORDER — SODIUM CHLORIDE 0.9 % (FLUSH) 0.9 %
5-40 SYRINGE (ML) INJECTION EVERY 12 HOURS SCHEDULED
Status: DISCONTINUED | OUTPATIENT
Start: 2024-04-21 | End: 2024-04-25 | Stop reason: HOSPADM

## 2024-04-21 RX ORDER — POLYETHYLENE GLYCOL 3350 17 G/17G
17 POWDER, FOR SOLUTION ORAL DAILY PRN
Status: DISCONTINUED | OUTPATIENT
Start: 2024-04-21 | End: 2024-04-23 | Stop reason: SDUPTHER

## 2024-04-21 RX ORDER — BISACODYL 10 MG
10 SUPPOSITORY, RECTAL RECTAL DAILY PRN
Status: DISCONTINUED | OUTPATIENT
Start: 2024-04-21 | End: 2024-04-25 | Stop reason: HOSPADM

## 2024-04-21 RX ORDER — ONDANSETRON 2 MG/ML
4 INJECTION INTRAMUSCULAR; INTRAVENOUS
Status: COMPLETED | OUTPATIENT
Start: 2024-04-21 | End: 2024-04-21

## 2024-04-21 RX ORDER — HYDROXYZINE HYDROCHLORIDE 25 MG/1
25 TABLET, FILM COATED ORAL 3 TIMES DAILY PRN
Status: DISCONTINUED | OUTPATIENT
Start: 2024-04-21 | End: 2024-04-25 | Stop reason: HOSPADM

## 2024-04-21 RX ORDER — DEXTROSE MONOHYDRATE 100 MG/ML
INJECTION, SOLUTION INTRAVENOUS CONTINUOUS PRN
Status: DISCONTINUED | OUTPATIENT
Start: 2024-04-21 | End: 2024-04-22 | Stop reason: SDUPTHER

## 2024-04-21 RX ADMIN — MORPHINE SULFATE 1 MG: 2 INJECTION, SOLUTION INTRAMUSCULAR; INTRAVENOUS at 22:38

## 2024-04-21 RX ADMIN — ONDANSETRON 4 MG: 2 INJECTION INTRAMUSCULAR; INTRAVENOUS at 22:38

## 2024-04-21 RX ADMIN — MORPHINE SULFATE 4 MG: 4 INJECTION, SOLUTION INTRAMUSCULAR; INTRAVENOUS at 10:23

## 2024-04-21 RX ADMIN — ONDANSETRON 4 MG: 2 INJECTION INTRAMUSCULAR; INTRAVENOUS at 10:23

## 2024-04-21 RX ADMIN — SODIUM CHLORIDE, PRESERVATIVE FREE 10 ML: 5 INJECTION INTRAVENOUS at 22:38

## 2024-04-21 RX ADMIN — SERTRALINE HYDROCHLORIDE 25 MG: 25 TABLET ORAL at 22:38

## 2024-04-21 RX ADMIN — HYDROXYZINE HYDROCHLORIDE 25 MG: 25 TABLET, FILM COATED ORAL at 22:38

## 2024-04-21 ASSESSMENT — PAIN - FUNCTIONAL ASSESSMENT: PAIN_FUNCTIONAL_ASSESSMENT: ACTIVITIES ARE NOT PREVENTED

## 2024-04-21 ASSESSMENT — PAIN SCALES - PAIN ASSESSMENT IN ADVANCED DEMENTIA (PAINAD)
NEGVOCALIZATION: OCCASIONAL MOAN/GROAN, LOW SPEECH, NEGATIVE/DISAPPROVING QUALITY
BREATHING: NORMAL
BODYLANGUAGE: RELAXED
BODYLANGUAGE: RELAXED
CONSOLABILITY: UNABLE TO CONSOLE, DISTRACT OR REASSURE
TOTALSCORE: 0
TOTALSCORE: 0
CONSOLABILITY: NO NEED TO CONSOLE
BODYLANGUAGE: RELAXED
CONSOLABILITY: NO NEED TO CONSOLE
BREATHING: NORMAL
TOTALSCORE: 9
FACIALEXPRESSION: SMILING OR INEXPRESSIVE
BREATHING: NOISY LABORED BREATHING, LONG PERIODS HYPERVENTILATION, CHEYNE-STOKES RESPIRATIONS
TOTALSCORE: 0
TOTALSCORE: 0
BREATHING: NORMAL
FACIALEXPRESSION: SMILING OR INEXPRESSIVE
FACIALEXPRESSION: FACIAL GRIMACING
CONSOLABILITY: NO NEED TO CONSOLE
BODYLANGUAGE: RELAXED
FACIALEXPRESSION: SMILING OR INEXPRESSIVE
BREATHING: NORMAL
FACIALEXPRESSION: SMILING OR INEXPRESSIVE
BODYLANGUAGE: RIGID, FISTS CLENCHED, KNEES UP, PUSHING/PULLING AWAY, STRIKES OUT
CONSOLABILITY: NO NEED TO CONSOLE

## 2024-04-21 ASSESSMENT — PAIN SCALES - GENERAL
PAINLEVEL_OUTOF10: 0
PAINLEVEL_OUTOF10: 8

## 2024-04-21 ASSESSMENT — PAIN DESCRIPTION - LOCATION: LOCATION: HIP

## 2024-04-21 ASSESSMENT — PAIN DESCRIPTION - ORIENTATION: ORIENTATION: RIGHT

## 2024-04-21 ASSESSMENT — PAIN DESCRIPTION - DESCRIPTORS: DESCRIPTORS: THROBBING
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
## 2024-04-21 NOTE — ED NOTES
This nurse, Pooja, RN, Carmelita, RN and Resident Danilo at bedside. Resident danilo gave verbal order to place harrington. Pt was repositioned in bed, incit care provided and harrington placed. Pt is noted to have several open lesions to her labias and top of vagina. Dr. Gómez was called into room so that he may assess them. Pt is scratching these areas vigorously. Pt now lying on stretcher, side rails up x2, bed in lowest position, call bell within each, no distress noted at this time.

## 2024-04-21 NOTE — PROGRESS NOTES
INTERIM UPDATE - 1132 EST on 4/21/2024    [facility] ER Resident Physician calls requesting admission for a 89-year-old female who hail from [facility] and who presents with complaint of Witnessed Fall.  Patient reportedly had a witnessed fall while in the shower on Friday (4/19/2024).    Per [facility] ER Resident Physician, Patient has a Right Femur Fracture. Orthopedic Surgical services were reportedly consulted and requested admission to Hospitalist services.    Per [facility] ER Resident Physician, Patient is not anticoagulated and Type & Screen have been obtained.  [facility] ER Resident Physician reports that Patient is hemodynamically stable, requires no ancillary oxygen, and exhibits no arrhythmia.    Plan:  Instructed [facility] ER Resident Physician to obtain EKG and CXR as a part of Pre-Surgical Evaluation of Patient.    Will admit Patient to [facility] Medical/Surgical Unit for management of Right Femur Fracture.

## 2024-04-21 NOTE — ED PROVIDER NOTES
Emergency Department Treatment Report    Patient: Jerome Javed Age: 89 y.o. Sex: female    YOB: 1925 Admit Date: 4/21/2024 PCP: Stella Yap MD   MRN: [id number]  CSN: [id number]     Room: [facility]/01 Time Dictated: 4:43 PM        Chief Complaint   Chief Complaint   Patient presents with    Fall    Leg Injury     right       History of Present Illness   Jerome Javed is a 89 y.o. female with past medical history of type 2 diabetes mellitus, hypertension, hypothyroidism, dementia who presents to the ED with the chief complaint of right leg pain/fall.     History initially obtained from EMS, collateral obtained from nursing home facility.    Patient fell, witnessed in the shower on Friday.  The provider at the nursing facility evaluated the patient, ordered an x-ray, which was completed yesterday.  The result of the x-ray came through today, diagnosed with a right femur fracture.  They notified Ms. Huggins, medical decision maker/family of the patient, who recommended she go to the emergency room.    Per nursing facility, fall was witnessed, no head trauma, no LOC, no blood thinners.    Patient has been receiving acetaminophen as needed for pain and has not been controlled, moaning and yelling in pain.      Review of Systems   Review of Systems   Unable to perform ROS: Dementia          Past Medical/Surgical History     Past Medical History:   Diagnosis Date    Breast cancer (HCC)     Diabetes (HCC)     HFrEF (heart failure with reduced ejection fraction) (HCC) 04/21/2024    Hypertension     Hypothyroid      Past Surgical History:   Procedure Laterality Date    APPENDECTOMY      BREAST SURGERY      MASTECTOMY         Social History     Social History     Socioeconomic History    Marital status: Single   Tobacco Use    Smoking status: Never    Smokeless tobacco: Never   Substance and Sexual Activity    Alcohol use: No    Drug use: No     Social Determinants of Health     Food Insecurity: Patient  AM   Result Value Ref Range    WBC, UA 3 to 5 0 - 4 /hpf    RBC, UA 0 to 3 0 - 5 /hpf    Epithelial Cells UA FEW 0 - 5 /lpf    BACTERIA, URINE FEW (A) NEG /hpf   TYPE AND SCREEN    Collection Time: 04/21/24 12:12 PM   Result Value Ref Range    Crossmatch expiration date 04/24/2024,2359     ABO/Rh B POSITIVE     Antibody Screen NEG    POCT Glucose    Collection Time: 04/21/24  4:13 PM   Result Value Ref Range    POC Glucose 176 (H) 70 - 110 mg/dL       Imaging:    XR CHEST PORTABLE   Final Result      1. Stable chronic findings. No radiographic evidence for acute cardiopulmonary   disease.      XR KNEE RIGHT (1-2 VIEWS)   Final Result      Comminuted fracture of the distal femur with angulation and foreshortening as   described.      XR FEMUR RIGHT (MIN 2 VIEWS)   Final Result      Comminuted fracture of the distal femur metadiaphysis with foreshortening and   angulation as described.      XR PELVIS (1-2 VIEWS)   Final Result      Degenerative changes as described, no acute fractures of the proximal   femur/hips.                 ED Course/Medical Decision Making       ED Course as of 04/21/24 1643   Sun Apr 21, 2024   0950 TIBURCIO Griffiths - on Friday, witnessed fall in the shower, they spoke with doctor, there was swelling and bruising, XR, bed rest. XR last night, XR result this a morning and family wanted her to be seen in the ER.     - [phone], Joseluis Jacqueline.     No blood thinners, dementia, combative.   DNR/DNI   [MW]   7649 Discussed with medical decision maker, Ms. Huggins, the patient's granddaughter, who is out of town. She does not know if she wants to move forward with surgery at this time, so would like the surgery team to call with their thoughts and recommendations. Another family member is on the way, JULISSA MAYERS [MW]   1018 WBC(!): 15.1  Leukocytosis [MW]   1019 Hemoglobin Quant(!): 8.7  Down from 11.1 about 6 months ago [MW]   1024 Pain is much better controlled. Compartments are soft, but obviously

## 2024-04-21 NOTE — H&P
History and Physical          Subjective     HPI: Jerome Javed is a 89 y.o. female with a PMHx of DM, HTN, Hypothyroid, dementia who presented to the ED from SNF with right left pain after fall in the shower 2 days ago. She had an XR last night and results came back this morning. Patient is unable to provide any information. Patient's POA (granddaughter) confirms that patient is DNR and is comfort measures only at her facility. She wishes for patient to have surgery to help with pain control.     In the ED, VSS. Labs with , Cr 1.12, WBC 15.1, hgb 8.7 (was 11.1 last September), UA negative. XR with comminuted fx of the distal femur. CXR negative. Ortho was consulted.     PMHx:  Past Medical History:   Diagnosis Date    Breast cancer (HCC)     Diabetes (HCC)     Hypertension     Hypothyroid        PSurgHx:  Past Surgical History:   Procedure Laterality Date    APPENDECTOMY      BREAST SURGERY      MASTECTOMY         SocialHx:  Social History     Socioeconomic History    Marital status: Single   Tobacco Use    Smoking status: Never    Smokeless tobacco: Never   Substance and Sexual Activity    Alcohol use: No    Drug use: No       FamilyHx:  No family history on file.    Home Medications:  Prior to Admission medications    Medication Sig Start Date End Date Taking? Authorizing Provider   famotidine (PEPCID) 20 MG tablet Take 1 tablet by mouth once   Yes Elijah Merritt MD   hydrOXYzine HCl (ATARAX) 25 MG tablet Take 1 tablet by mouth daily Take one tab po daily at night for urticaria   Yes Elijah Merritt MD   levothyroxine (SYNTHROID) 75 MCG tablet Take 1 tablet by mouth Daily   Yes Elijah Merritt MD   lidocaine (LIDODERM) 5 % Place 1 patch onto the skin daily 12 hours on, 12 hours off. To right ankle/foot   Yes Elijah Merritt MD   sertraline (ZOLOFT) 25 MG tablet Take 1 tablet by mouth in the morning and at bedtime   Yes Elijah Merritt MD   ergocalciferol (ERGOCALCIFEROL)  There is soft tissue edema and vascular calcifications.     Comminuted fracture of the distal femur with angulation and foreshortening as described.    XR PELVIS (1-2 VIEWS)    Result Date: 4/21/2024  XR PELVIS (1-2 VIEWS) INDICATION: trauma, fall COMPARISON: No direct comparisons available. CT abdomen pelvis 2019 TECHNIQUE: Single AP view of the pelvis FINDINGS: There are no fractures or dislocations. There is bilateral femoral acetabular joint space narrowing and osteophytes. Degenerative changes noted of both sacroiliac joints and of the lower lumbar spine. Mineralization is low but within normal limits for age. Vascular calcifications are noted in the proximal lower extremities.     Degenerative changes as described, no acute fractures of the proximal femur/hips.    XR FEMUR RIGHT (MIN 2 VIEWS)    Result Date: 4/21/2024  XR FEMUR RIGHT (MIN 2 VIEWS) INDICATION: trauma, fall COMPARISON: None. TECHNIQUE: AP and lateral views of the proximal and distal femur obtained. 4 images total. FINDINGS: There is a comminuted fracture of the distal femur metadiaphysis with foreshortening and medial angulation of the distal fracture fragment. There is a 3 cm ossific fragment along the medial femoral condyle. Degenerative changes again seen of the knee and hip joints. Mineralization is low but within normal limits for age. There is soft tissue edema and vascular calcifications.     Comminuted fracture of the distal femur metadiaphysis with foreshortening and angulation as described.          Assessment/Plan       Right distal femur fracture  - appreciate ortho assistance  - PRN pain control  - bedrest, fall precautions  - family would like to proceed with surgery for comfort    Hypothyroid  - cont synthroid    DM with hyperglycemia  - spoke with POA. Will forego BG checks to keep patient more comfortable    Anemia: significant decrease in hgb since Sept 2023  - monitor H&H, will avoid chemical DVT ppx    Hx HTN  - controlled

## 2024-04-22 ENCOUNTER — ANESTHESIA EVENT (OUTPATIENT)
Facility: HOSPITAL | Age: 89
End: 2024-04-22
Payer: MEDICARE

## 2024-04-22 ENCOUNTER — ANESTHESIA (OUTPATIENT)
Facility: HOSPITAL | Age: 89
End: 2024-04-22
Payer: MEDICARE

## 2024-04-22 PROBLEM — Z66 DNR (DO NOT RESUSCITATE): Chronic | Status: ACTIVE | Noted: 2024-04-22

## 2024-04-22 PROBLEM — Z86.73 HISTORY OF CVA (CEREBROVASCULAR ACCIDENT): Chronic | Status: ACTIVE | Noted: 2024-04-22

## 2024-04-22 PROBLEM — Z86.73 HISTORY OF CVA (CEREBROVASCULAR ACCIDENT): Status: ACTIVE | Noted: 2024-04-22

## 2024-04-22 PROBLEM — Z66 DNR (DO NOT RESUSCITATE): Status: ACTIVE | Noted: 2024-04-22

## 2024-04-22 LAB
ANION GAP SERPL CALC-SCNC: 5 MMOL/L (ref 3–18)
BASOPHILS # BLD: 0 K/UL (ref 0–0.1)
BASOPHILS NFR BLD: 0 % (ref 0–2)
BUN SERPL-MCNC: 33 MG/DL (ref 7–18)
BUN/CREAT SERPL: 18 (ref 12–20)
CALCIUM SERPL-MCNC: 8.9 MG/DL (ref 8.5–10.1)
CHLORIDE SERPL-SCNC: 104 MMOL/L (ref 100–111)
CO2 SERPL-SCNC: 27 MMOL/L (ref 21–32)
CREAT SERPL-MCNC: 1.81 MG/DL (ref 0.6–1.3)
DIFFERENTIAL METHOD BLD: ABNORMAL
EKG ATRIAL RATE: 98 BPM
EKG DIAGNOSIS: NORMAL
EKG P AXIS: 91 DEGREES
EKG P-R INTERVAL: 162 MS
EKG Q-T INTERVAL: 400 MS
EKG QRS DURATION: 144 MS
EKG QTC CALCULATION (BAZETT): 510 MS
EKG R AXIS: 13 DEGREES
EKG T AXIS: 217 DEGREES
EKG VENTRICULAR RATE: 98 BPM
EOSINOPHIL # BLD: 0 K/UL (ref 0–0.4)
EOSINOPHIL NFR BLD: 0 % (ref 0–5)
ERYTHROCYTE [DISTWIDTH] IN BLOOD BY AUTOMATED COUNT: 14.5 % (ref 11.6–14.5)
EST. AVERAGE GLUCOSE BLD GHB EST-MCNC: 137 MG/DL
EST. AVERAGE GLUCOSE BLD GHB EST-MCNC: 140 MG/DL
GLUCOSE BLD STRIP.AUTO-MCNC: 218 MG/DL (ref 70–110)
GLUCOSE BLD STRIP.AUTO-MCNC: 221 MG/DL (ref 70–110)
GLUCOSE BLD STRIP.AUTO-MCNC: 320 MG/DL (ref 70–110)
GLUCOSE SERPL-MCNC: 299 MG/DL (ref 74–99)
HBA1C MFR BLD: 6.4 % (ref 4.2–5.6)
HBA1C MFR BLD: 6.5 % (ref 4.2–5.6)
HCT VFR BLD AUTO: 27.5 % (ref 35–45)
HGB BLD-MCNC: 8.5 G/DL (ref 12–16)
IMM GRANULOCYTES # BLD AUTO: 0 K/UL (ref 0–0.04)
IMM GRANULOCYTES NFR BLD AUTO: 0 % (ref 0–0.5)
LYMPHOCYTES # BLD: 0.8 K/UL (ref 0.9–3.6)
LYMPHOCYTES NFR BLD: 5 % (ref 21–52)
MCH RBC QN AUTO: 29.8 PG (ref 24–34)
MCHC RBC AUTO-ENTMCNC: 30.9 G/DL (ref 31–37)
MCV RBC AUTO: 96.5 FL (ref 78–100)
MONOCYTES # BLD: 1.4 K/UL (ref 0.05–1.2)
MONOCYTES NFR BLD: 9 % (ref 3–10)
NEUTS SEG # BLD: 13.2 K/UL (ref 1.8–8)
NEUTS SEG NFR BLD: 86 % (ref 40–73)
NRBC # BLD: 0 K/UL (ref 0–0.01)
NRBC BLD-RTO: 0 PER 100 WBC
PLATELET # BLD AUTO: 251 K/UL (ref 135–420)
PLATELET COMMENT: ABNORMAL
PMV BLD AUTO: 10.5 FL (ref 9.2–11.8)
POTASSIUM SERPL-SCNC: 4.8 MMOL/L (ref 3.5–5.5)
RBC # BLD AUTO: 2.85 M/UL (ref 4.2–5.3)
RBC MORPH BLD: ABNORMAL
SODIUM SERPL-SCNC: 136 MMOL/L (ref 136–145)
WBC # BLD AUTO: 15.4 K/UL (ref 4.6–13.2)

## 2024-04-22 PROCEDURE — 85025 COMPLETE CBC W/AUTO DIFF WBC: CPT

## 2024-04-22 PROCEDURE — 6370000000 HC RX 637 (ALT 250 FOR IP): Performed by: HOSPITALIST

## 2024-04-22 PROCEDURE — 99222 1ST HOSP IP/OBS MODERATE 55: CPT | Performed by: PHYSICIAN ASSISTANT

## 2024-04-22 PROCEDURE — 82962 GLUCOSE BLOOD TEST: CPT

## 2024-04-22 PROCEDURE — 1100000000 HC RM PRIVATE

## 2024-04-22 PROCEDURE — 94761 N-INVAS EAR/PLS OXIMETRY MLT: CPT

## 2024-04-22 PROCEDURE — 36415 COLL VENOUS BLD VENIPUNCTURE: CPT

## 2024-04-22 PROCEDURE — 99232 SBSQ HOSP IP/OBS MODERATE 35: CPT | Performed by: HOSPITALIST

## 2024-04-22 PROCEDURE — 93010 ELECTROCARDIOGRAM REPORT: CPT | Performed by: INTERNAL MEDICINE

## 2024-04-22 PROCEDURE — 6360000002 HC RX W HCPCS: Performed by: INTERNAL MEDICINE

## 2024-04-22 PROCEDURE — 2580000003 HC RX 258: Performed by: PHYSICIAN ASSISTANT

## 2024-04-22 PROCEDURE — 83036 HEMOGLOBIN GLYCOSYLATED A1C: CPT

## 2024-04-22 PROCEDURE — 80048 BASIC METABOLIC PNL TOTAL CA: CPT

## 2024-04-22 RX ORDER — GLUCAGON 1 MG
1 KIT INJECTION PRN
Status: DISCONTINUED | OUTPATIENT
Start: 2024-04-22 | End: 2024-04-23 | Stop reason: SDUPTHER

## 2024-04-22 RX ORDER — LORAZEPAM 2 MG/ML
0.5 INJECTION INTRAMUSCULAR
Status: DISCONTINUED | OUTPATIENT
Start: 2024-04-22 | End: 2024-04-25 | Stop reason: HOSPADM

## 2024-04-22 RX ORDER — INSULIN LISPRO 100 [IU]/ML
0-4 INJECTION, SOLUTION INTRAVENOUS; SUBCUTANEOUS NIGHTLY
Status: DISCONTINUED | OUTPATIENT
Start: 2024-04-22 | End: 2024-04-25 | Stop reason: HOSPADM

## 2024-04-22 RX ORDER — DEXTROSE MONOHYDRATE 100 MG/ML
INJECTION, SOLUTION INTRAVENOUS CONTINUOUS PRN
Status: DISCONTINUED | OUTPATIENT
Start: 2024-04-22 | End: 2024-04-23 | Stop reason: SDUPTHER

## 2024-04-22 RX ORDER — INSULIN LISPRO 100 [IU]/ML
0-4 INJECTION, SOLUTION INTRAVENOUS; SUBCUTANEOUS
Status: DISCONTINUED | OUTPATIENT
Start: 2024-04-22 | End: 2024-04-23 | Stop reason: SDUPTHER

## 2024-04-22 RX ADMIN — SODIUM CHLORIDE, PRESERVATIVE FREE 10 ML: 5 INJECTION INTRAVENOUS at 20:27

## 2024-04-22 RX ADMIN — SODIUM CHLORIDE, PRESERVATIVE FREE 10 ML: 5 INJECTION INTRAVENOUS at 08:36

## 2024-04-22 RX ADMIN — LORAZEPAM 0.5 MG: 2 INJECTION, SOLUTION INTRAMUSCULAR; INTRAVENOUS at 04:08

## 2024-04-22 RX ADMIN — INSULIN LISPRO 1 UNITS: 100 INJECTION, SOLUTION INTRAVENOUS; SUBCUTANEOUS at 17:20

## 2024-04-22 ASSESSMENT — PAIN SCALES - PAIN ASSESSMENT IN ADVANCED DEMENTIA (PAINAD)
BODYLANGUAGE: RELAXED
TOTALSCORE: 0
TOTALSCORE: 7
CONSOLABILITY: UNABLE TO CONSOLE, DISTRACT OR REASSURE
BODYLANGUAGE: RIGID, FISTS CLENCHED, KNEES UP, PUSHING/PULLING AWAY, STRIKES OUT
BREATHING: NORMAL
NEGVOCALIZATION: OCCASIONAL MOAN/GROAN, LOW SPEECH, NEGATIVE/DISAPPROVING QUALITY
FACIALEXPRESSION: SMILING OR INEXPRESSIVE
FACIALEXPRESSION: FACIAL GRIMACING
CONSOLABILITY: NO NEED TO CONSOLE
BREATHING: NORMAL

## 2024-04-22 ASSESSMENT — PAIN SCALES - GENERAL
PAINLEVEL_OUTOF10: 0
PAINLEVEL_OUTOF10: 0

## 2024-04-22 NOTE — CONSULTS
[facility]    Consult Note    Patient: Jerome Javed               Sex: female          DOA: 4/21/2024         YOB: 1925      Age:  89 y.o.        LOS:  LOS: 1 day              HPI:     Jerome Javed is a 89 y.o. female who has been seen for right leg pain. Patient sleeping comfortably at time of exam. Per patients POA (granddaughter) she fell in the shower 2-3 days ago. POA states that patient is DNR and is comfort measures only. POA wishes for surgery to help with pain control    Past Medical History:   Diagnosis Date    Breast cancer (HCC)     CVA (cerebral vascular accident) ([facility])     Subtle Small Remote Basal Ganglia Lacunar Infarcts; Tiny Remote Left Cerebellar Infarct, per CT Head w/o Contrast on 9/27/2023    Diabetes ([facility])     DNR (do not resuscitate)     HFrEF (heart failure with reduced ejection fraction) ([facility]) 04/21/2024    Hypertension     Hypothyroid        Past Surgical History:   Procedure Laterality Date    APPENDECTOMY      BREAST SURGERY      MASTECTOMY         History reviewed. No pertinent family history.    Social History     Socioeconomic History    Marital status: Single     Spouse name: None    Number of children: None    Years of education: None    Highest education level: None   Tobacco Use    Smoking status: Never    Smokeless tobacco: Never   Substance and Sexual Activity    Alcohol use: No    Drug use: No     Social Determinants of Health     Food Insecurity: Patient Unable To Answer (4/21/2024)    Hunger Vital Sign     Worried About Running Out of Food in the Last Year: Patient unable to answer     Ran Out of Food in the Last Year: Patient unable to answer   Transportation Needs: Patient Unable To Answer (4/21/2024)    PRAPARE - Transportation     Lack of Transportation (Medical): Patient unable to answer     Lack of Transportation (Non-Medical): Patient unable to answer   Housing Stability: Patient Unable To Answer

## 2024-04-22 NOTE — PROGRESS NOTES
[facility]  Progress Note    Patient: Jerome Javed Age: 89 y.o. : 10/18/1925 MR#: [id number] SSN: [id number]  Date/Time: 2024     Subjective:     Patient seen evaluated, lying in bed, no acute distress.  Patient is unable to communicate.  Unclear what her baseline is.        Assessment/Plan:     Right distal femur fracture-ORIF in a.m. per Ortho.  History of hypothyroidism-continue Synthroid  History of anemia-continue to monitor hemoglobin, transfuse for hemoglobin less than 7.  History of hypertension-controlled without any medications  History of dementia with failure to thrive  DVT prophylaxis-Lovenox  DNR            Anticipated Discharge:      to skilled nursing facility  Rehab    Darrell Leon MD  24      Case discussed with:  []Patient  []Family  [x]Nursing  []Case Management  DVT Prophylaxis:  []Lovenox  []Hep SQ  []SCDs  []Coumadin   []On Heparin gtt    Objective:   VS:   Vitals:    24 1146   BP: (!) 105/52   Pulse: 81   Resp: 16   Temp: 97.8 °F (36.6 °C)   SpO2: 98%        Intake/Output Summary (Last 24 hours) at 2024 1408  Last data filed at 2024 0931  Gross per 24 hour   Intake 10 ml   Output 400 ml   Net -390 ml       General: Lying in bed, no acute distress, patient is noncommunicative.  Pulmonary: Decreased breath sounds in bases bilaterally  Cardiovascular: Regular rate and Rhythm.  GI:  Soft, Non distended, Non tender. + Bowel sounds.  Extremities:  No edema, cyanosis, clubbing. No calf tenderness.   Neurologic: Unable to do neurological examination  Additional:    Medications:   Current Facility-Administered Medications   Medication Dose Route Frequency    nitroglycerin (NITRO-BID) 2 % ointment 0.5 inch  0.5 inch Topical Q6H PRN    LORazepam (ATIVAN) injection 0.5 mg  0.5 mg IntraVENous Q1H PRN    sodium chloride flush 0.9 % injection 5-40 mL  5-40 mL IntraVENous 2 times per day    sodium chloride flush 0.9 %  (A) NEG mg/dL    Ketones, Urine Negative NEG mg/dL    Bilirubin Urine Negative NEG      Blood, Urine Negative NEG      Urobilinogen, Urine 1.0 0.2 - 1.0 EU/dL    Nitrite, Urine Negative NEG      Leukocyte Esterase, Urine Negative NEG     Urinalysis, Micro    Collection Time: 04/21/24 10:00 AM   Result Value Ref Range    WBC, UA 3 to 5 0 - 4 /hpf    RBC, UA 0 to 3 0 - 5 /hpf    Epithelial Cells UA FEW 0 - 5 /lpf    BACTERIA, URINE FEW (A) NEG /hpf   TYPE AND SCREEN    Collection Time: 04/21/24 12:12 PM   Result Value Ref Range    Crossmatch expiration date 04/24/2024,2359     ABO/Rh B POSITIVE     Antibody Screen NEG    EKG 12 Lead    Collection Time: 04/21/24 12:13 PM   Result Value Ref Range    Ventricular Rate 98 BPM    Atrial Rate 98 BPM    P-R Interval 162 ms    QRS Duration 144 ms    Q-T Interval 400 ms    QTc Calculation (Bazett) 510 ms    P Axis 91 degrees    R Axis 13 degrees    T Axis 217 degrees    Diagnosis       Normal sinus rhythm  Left bundle branch block  Abnormal ECG  No significant change was found  Confirmed by MD Nghia, Raheel ([id number]) on 4/22/2024 8:15:00 AM     POCT Glucose    Collection Time: 04/21/24  4:13 PM   Result Value Ref Range    POC Glucose 176 (H) 70 - 110 mg/dL   POCT Glucose    Collection Time: 04/21/24  8:23 PM   Result Value Ref Range    POC Glucose 212 (H) 70 - 110 mg/dL   Basic Metabolic Panel w/ Reflex to MG    Collection Time: 04/22/24  4:40 AM   Result Value Ref Range    Sodium 136 136 - 145 mmol/L    Potassium 4.8 3.5 - 5.5 mmol/L    Chloride 104 100 - 111 mmol/L    CO2 27 21 - 32 mmol/L    Anion Gap 5 3.0 - 18 mmol/L    Glucose 299 (H) 74 - 99 mg/dL    BUN 33 (H) 7.0 - 18 MG/DL    Creatinine 1.81 (H) 0.6 - 1.3 MG/DL    Bun/Cre Ratio 18 12 - 20      Est, Glom Filt Rate 25 (L) >60 ml/min/1.73m2    Calcium 8.9 8.5 - 10.1 MG/DL   CBC with Auto Differential    Collection Time: 04/22/24  4:40 AM   Result Value Ref Range    WBC 15.4 (H) 4.6 - 13.2 K/uL    RBC 2.85 (L) 4.20 - 5.30

## 2024-04-22 NOTE — INTERVAL H&P NOTE
Update History & Physical    The patient's History and Physical of April 22, 2024 was reviewed with the patient and I examined the patient. There was no change. The surgical site was confirmed by the patient and me.     Plan: The risks, benefits, expected outcome, and alternative to the recommended procedure have been discussed with the patient. Patient understands and wants to proceed with the procedure.     Electronically signed by SARAH DAVENPORT MD on 4/22/2024 at 11:52 AM

## 2024-04-22 NOTE — CARE COORDINATION
Voicemail left with family requesting return call: Brooklynn Deutsch @ [phone].  Unable to leave voicemail with Mary Thomas - telephone number provided in chart is not functional.  Expectation at discharge for patient to return to [facility] with S transport.       04/22/24 0969   Service Assessment   Patient Orientation Unable to Assess   History Provided By Medical Record   Primary Caregiver Other (Comment)  ([facility] SNF staff)   Support Systems Family Members   Patient's Healthcare Decision Maker is: Legal Next of Kin   PCP Verified by CM Yes   Last Visit to PCP Within last 3 months   Prior Functional Level Bathing;Dressing;Toileting;Mobility;Assistance with the following:   Current Functional Level Assistance with the following:   Can patient return to prior living arrangement Yes   Ability to make needs known: Unable   Family able to assist with home care needs: No   Would you like for me to discuss the discharge plan with any other family members/significant others, and if so, who? Yes  (STANK - Mary Guzman)   Financial Resources Medicaid;Medicare   Social/Functional History   Lives With Other (comment)  ([facility])   Type of Home Facility   Home Layout One level   Home Access Level entry   ADL Assistance Needs assistance   Ambulation Assistance Non-ambulatory   Transfer Assistance Needs assistance   Occupation On disability   Discharge Planning   Type of Residence Long-Term Care   Current Services Prior To Admission Long Term Acute Care   Potential Assistance Needed Skilled Nursing Facility;Transportation   DME Ordered? No   Potential Assistance Purchasing Medications No   Type of Home Care Services Aide Services   Patient expects to be discharged to: Long-term care   History of falls? 0   Services At/After Discharge   Transition of Care Consult (CM Consult) Long Term Care   Services At/After Discharge Long term care;Transport    Resource Information Provided? No   Mode of Transport at

## 2024-04-22 NOTE — PROGRESS NOTES
Ortho    Pt seen and evaluated  Sleeping  Non verbal  Unchanged per nursing    BP (!) 105/52   Pulse 81   Temp 97.8 °F (36.6 °C) (Axillary)   Resp 16   Ht 1.524 m (5')   Wt 57.2 kg (126 lb 3.2 oz)   SpO2 98%   BMI 24.65 kg/m²   Right LE  Knee immobilizer in place   Nv intact    A: right distal femur fx    P:  surgery was planned however no consent was able to be obtained.  Hold blood thinners.  Will make NPO after mn for possible surgical intervention.

## 2024-04-22 NOTE — PROGRESS NOTES
Advance Care Planning   Healthcare Decision Maker:    Primary Decision Maker (Active): Jacqueline Huggins - Rossi - [phone]    Click here to complete Healthcare Decision Makers including selection of the Healthcare Decision Maker Relationship (ie \"Primary\").  Today we documented Decision Maker(s) consistent with Legal Next of Kin hierarchy.        conducted an initial consultation and Spiritual Assessment for Jerome Javed, who is a 89 y.o.,female. Patient's Primary Language is: English.   According to the patient's EMR Pent[employer] Affiliation is: None.     The reason the Patient came to the hospital is:   Patient Active Problem List    Diagnosis Date Noted    DNR (do not resuscitate) 04/22/2024    History of CVA (cerebrovascular accident) 04/22/2024    Other fracture of right femur, initial encounter for closed fracture ([facility]) 04/21/2024    HFrEF (heart failure with reduced ejection fraction) ([facility]) 04/21/2024    History of breast cancer 04/21/2024    Bradycardia 01/28/2021    Alzheimer's dementia ([facility]) 02/28/2016    Mixed hyperlipidemia 02/26/2016    Hypothyroidism (acquired) 02/26/2016    Hypertension, benign 02/26/2016    Syncope 05/18/2014    Nausea with vomiting 05/18/2014    Controlled type 2 diabetes mellitus with hyperglycemia, without long-term current use of insulin ([facility]) 05/18/2014    Altered mental status 05/18/2014    GI bleed 09/10/2013        The  provided the following Interventions:  Initiated a relationship of care and support.   Offered prayer and assurance of continued prayers on patient's behalf.   Chart reviewed.    The following outcomes where achieved:  Patient already has a scanned Advance Medical Directive in the chart. See ADV Care Plan. No changes need to be made.    Plan:  Chaplains will continue to follow and will provide pastoral care on an as needed/requested basis.   recommends bedside caregivers page  on duty if patient shows signs of acute

## 2024-04-23 ENCOUNTER — APPOINTMENT (OUTPATIENT)
Facility: HOSPITAL | Age: 89
End: 2024-04-23
Payer: MEDICARE

## 2024-04-23 PROBLEM — N17.9 ACUTE RENAL FAILURE (HCC): Status: ACTIVE | Noted: 2024-04-23

## 2024-04-23 PROBLEM — R54 ADVANCED AGE: Status: ACTIVE | Noted: 2024-04-23

## 2024-04-23 PROBLEM — R73.9 HYPERGLYCEMIA: Status: ACTIVE | Noted: 2024-04-23

## 2024-04-23 PROBLEM — D72.829 LEUKOCYTOSIS: Status: ACTIVE | Noted: 2024-04-23

## 2024-04-23 LAB
ANION GAP SERPL CALC-SCNC: 3 MMOL/L (ref 3–18)
BASOPHILS # BLD: 0.2 K/UL (ref 0–0.1)
BASOPHILS NFR BLD: 1 % (ref 0–2)
BUN SERPL-MCNC: 48 MG/DL (ref 7–18)
BUN/CREAT SERPL: 23 (ref 12–20)
CALCIUM SERPL-MCNC: 8.7 MG/DL (ref 8.5–10.1)
CHLORIDE SERPL-SCNC: 107 MMOL/L (ref 100–111)
CO2 SERPL-SCNC: 28 MMOL/L (ref 21–32)
CREAT SERPL-MCNC: 2.1 MG/DL (ref 0.6–1.3)
DIFFERENTIAL METHOD BLD: ABNORMAL
EOSINOPHIL # BLD: 0 K/UL (ref 0–0.4)
EOSINOPHIL NFR BLD: 0 % (ref 0–5)
ERYTHROCYTE [DISTWIDTH] IN BLOOD BY AUTOMATED COUNT: 14.6 % (ref 11.6–14.5)
GLUCOSE BLD STRIP.AUTO-MCNC: 181 MG/DL (ref 70–110)
GLUCOSE BLD STRIP.AUTO-MCNC: 201 MG/DL (ref 70–110)
GLUCOSE BLD STRIP.AUTO-MCNC: 219 MG/DL (ref 70–110)
GLUCOSE BLD STRIP.AUTO-MCNC: 246 MG/DL (ref 70–110)
GLUCOSE SERPL-MCNC: 202 MG/DL (ref 74–99)
HCT VFR BLD AUTO: 29.7 % (ref 35–45)
HGB BLD-MCNC: 9 G/DL (ref 12–16)
IMM GRANULOCYTES # BLD AUTO: 0 K/UL (ref 0–0.04)
IMM GRANULOCYTES NFR BLD AUTO: 0 % (ref 0–0.5)
LYMPHOCYTES # BLD: 1.8 K/UL (ref 0.9–3.6)
LYMPHOCYTES NFR BLD: 10 % (ref 21–52)
MCH RBC QN AUTO: 29.1 PG (ref 24–34)
MCHC RBC AUTO-ENTMCNC: 30.3 G/DL (ref 31–37)
MCV RBC AUTO: 96.1 FL (ref 78–100)
MONOCYTES # BLD: 1.9 K/UL (ref 0.05–1.2)
MONOCYTES NFR BLD: 11 % (ref 3–10)
NEUTS SEG # BLD: 13.6 K/UL (ref 1.8–8)
NEUTS SEG NFR BLD: 78 % (ref 40–73)
NRBC # BLD: 0.04 K/UL (ref 0–0.01)
NRBC BLD-RTO: 0.2 PER 100 WBC
PLATELET # BLD AUTO: 276 K/UL (ref 135–420)
PLATELET COMMENT: ABNORMAL
PMV BLD AUTO: 10.7 FL (ref 9.2–11.8)
POTASSIUM SERPL-SCNC: 4.9 MMOL/L (ref 3.5–5.5)
RBC # BLD AUTO: 3.09 M/UL (ref 4.2–5.3)
RBC MORPH BLD: ABNORMAL
SODIUM SERPL-SCNC: 138 MMOL/L (ref 136–145)
WBC # BLD AUTO: 17.5 K/UL (ref 4.6–13.2)

## 2024-04-23 PROCEDURE — P9045 ALBUMIN (HUMAN), 5%, 250 ML: HCPCS | Performed by: NURSE ANESTHETIST, CERTIFIED REGISTERED

## 2024-04-23 PROCEDURE — 2500000003 HC RX 250 WO HCPCS: Performed by: NURSE ANESTHETIST, CERTIFIED REGISTERED

## 2024-04-23 PROCEDURE — 3600000002 HC SURGERY LEVEL 2 BASE: Performed by: ORTHOPAEDIC SURGERY

## 2024-04-23 PROCEDURE — 94761 N-INVAS EAR/PLS OXIMETRY MLT: CPT

## 2024-04-23 PROCEDURE — 6360000002 HC RX W HCPCS: Performed by: PHYSICIAN ASSISTANT

## 2024-04-23 PROCEDURE — 7100000001 HC PACU RECOVERY - ADDTL 15 MIN: Performed by: ORTHOPAEDIC SURGERY

## 2024-04-23 PROCEDURE — 2580000003 HC RX 258: Performed by: ORTHOPAEDIC SURGERY

## 2024-04-23 PROCEDURE — 85025 COMPLETE CBC W/AUTO DIFF WBC: CPT

## 2024-04-23 PROCEDURE — A4216 STERILE WATER/SALINE, 10 ML: HCPCS | Performed by: NURSE ANESTHETIST, CERTIFIED REGISTERED

## 2024-04-23 PROCEDURE — 2580000003 HC RX 258: Performed by: PHYSICIAN ASSISTANT

## 2024-04-23 PROCEDURE — L1830 KO IMMOB CANVAS LONG PRE OTS: HCPCS | Performed by: ORTHOPAEDIC SURGERY

## 2024-04-23 PROCEDURE — C1769 GUIDE WIRE: HCPCS | Performed by: ORTHOPAEDIC SURGERY

## 2024-04-23 PROCEDURE — 2580000003 HC RX 258: Performed by: HOSPITALIST

## 2024-04-23 PROCEDURE — 6360000002 HC RX W HCPCS: Performed by: NURSE ANESTHETIST, CERTIFIED REGISTERED

## 2024-04-23 PROCEDURE — 7100000000 HC PACU RECOVERY - FIRST 15 MIN: Performed by: ORTHOPAEDIC SURGERY

## 2024-04-23 PROCEDURE — 80048 BASIC METABOLIC PNL TOTAL CA: CPT

## 2024-04-23 PROCEDURE — 73552 X-RAY EXAM OF FEMUR 2/>: CPT

## 2024-04-23 PROCEDURE — 82962 GLUCOSE BLOOD TEST: CPT

## 2024-04-23 PROCEDURE — 36415 COLL VENOUS BLD VENIPUNCTURE: CPT

## 2024-04-23 PROCEDURE — 6360000002 HC RX W HCPCS: Performed by: ORTHOPAEDIC SURGERY

## 2024-04-23 PROCEDURE — C1713 ANCHOR/SCREW BN/BN,TIS/BN: HCPCS | Performed by: ORTHOPAEDIC SURGERY

## 2024-04-23 PROCEDURE — A4217 STERILE WATER/SALINE, 500 ML: HCPCS | Performed by: ORTHOPAEDIC SURGERY

## 2024-04-23 PROCEDURE — 3600000012 HC SURGERY LEVEL 2 ADDTL 15MIN: Performed by: ORTHOPAEDIC SURGERY

## 2024-04-23 PROCEDURE — 2709999900 HC NON-CHARGEABLE SUPPLY: Performed by: ORTHOPAEDIC SURGERY

## 2024-04-23 PROCEDURE — 99232 SBSQ HOSP IP/OBS MODERATE 35: CPT | Performed by: HOSPITALIST

## 2024-04-23 PROCEDURE — 6370000000 HC RX 637 (ALT 250 FOR IP): Performed by: ORTHOPAEDIC SURGERY

## 2024-04-23 PROCEDURE — 1100000000 HC RM PRIVATE

## 2024-04-23 PROCEDURE — 2580000003 HC RX 258: Performed by: NURSE ANESTHETIST, CERTIFIED REGISTERED

## 2024-04-23 PROCEDURE — 2500000003 HC RX 250 WO HCPCS: Performed by: ORTHOPAEDIC SURGERY

## 2024-04-23 PROCEDURE — 3700000000 HC ANESTHESIA ATTENDED CARE: Performed by: ORTHOPAEDIC SURGERY

## 2024-04-23 PROCEDURE — C1776 JOINT DEVICE (IMPLANTABLE): HCPCS | Performed by: ORTHOPAEDIC SURGERY

## 2024-04-23 PROCEDURE — 6370000000 HC RX 637 (ALT 250 FOR IP): Performed by: HOSPITALIST

## 2024-04-23 PROCEDURE — 3700000001 HC ADD 15 MINUTES (ANESTHESIA): Performed by: ORTHOPAEDIC SURGERY

## 2024-04-23 PROCEDURE — 0QSB04Z REPOSITION RIGHT LOWER FEMUR WITH INTERNAL FIXATION DEVICE, OPEN APPROACH: ICD-10-PCS | Performed by: ORTHOPAEDIC SURGERY

## 2024-04-23 PROCEDURE — 2720000010 HC SURG SUPPLY STERILE: Performed by: ORTHOPAEDIC SURGERY

## 2024-04-23 DEVICE — IMPLANTABLE DEVICE: Type: IMPLANTABLE DEVICE | Site: FEMUR | Status: FUNCTIONAL

## 2024-04-23 DEVICE — SCREW BNE L42MM DIA5MM CNDYL S STL ST VAR ANG LOK T25: Type: IMPLANTABLE DEVICE | Site: FEMUR | Status: FUNCTIONAL

## 2024-04-23 DEVICE — PLATE BNE L230MM 10 H NONSTERILE R CNDYL S STL CRV LOK: Type: IMPLANTABLE DEVICE | Site: FEMUR | Status: FUNCTIONAL

## 2024-04-23 DEVICE — SCREW BNE L36MM DIA5MM CNDYL S STL ST VAR ANG LOK COMPR T25: Type: IMPLANTABLE DEVICE | Site: FEMUR | Status: FUNCTIONAL

## 2024-04-23 DEVICE — SCREW BNE L34MM DIA3.5MM CORT S STL ST NONCANNULATED LOK: Type: IMPLANTABLE DEVICE | Site: FEMUR | Status: FUNCTIONAL

## 2024-04-23 DEVICE — SCREW BNE L34MM DIA5MM CNDYL S STL ST VAR ANG LOK FULL THRD: Type: IMPLANTABLE DEVICE | Site: FEMUR | Status: FUNCTIONAL

## 2024-04-23 DEVICE — GRAFT BNE SUB M 5ML CANC DBM FRMBL CELLULAR VIVIGEN: Type: IMPLANTABLE DEVICE | Site: FEMUR | Status: FUNCTIONAL

## 2024-04-23 DEVICE — CABLE SURG DIA1.7MM S STL HA CERCLAGE W/ CRMP 29880101S] DEPUY SYNTHES USA]: Type: IMPLANTABLE DEVICE | Site: FEMUR | Status: FUNCTIONAL

## 2024-04-23 DEVICE — SCREW BNE L32MM DIA5MM CNDYL S STL ST VAR ANG LOK T25: Type: IMPLANTABLE DEVICE | Site: FEMUR | Status: FUNCTIONAL

## 2024-04-23 RX ORDER — SODIUM CHLORIDE 9 MG/ML
INJECTION, SOLUTION INTRAVENOUS CONTINUOUS
Status: DISCONTINUED | OUTPATIENT
Start: 2024-04-23 | End: 2024-04-24

## 2024-04-23 RX ORDER — OXYCODONE HYDROCHLORIDE 5 MG/1
2.5 TABLET ORAL
Status: DISCONTINUED | OUTPATIENT
Start: 2024-04-23 | End: 2024-04-25 | Stop reason: HOSPADM

## 2024-04-23 RX ORDER — SODIUM CHLORIDE 9 MG/ML
INJECTION, SOLUTION INTRAVENOUS CONTINUOUS
Status: DISCONTINUED | OUTPATIENT
Start: 2024-04-23 | End: 2024-04-25 | Stop reason: HOSPADM

## 2024-04-23 RX ORDER — ONDANSETRON 2 MG/ML
4 INJECTION INTRAMUSCULAR; INTRAVENOUS
Status: DISCONTINUED | OUTPATIENT
Start: 2024-04-23 | End: 2024-04-23

## 2024-04-23 RX ORDER — SODIUM CHLORIDE 0.9 % (FLUSH) 0.9 %
5-40 SYRINGE (ML) INJECTION EVERY 12 HOURS SCHEDULED
Status: DISCONTINUED | OUTPATIENT
Start: 2024-04-23 | End: 2024-04-25 | Stop reason: HOSPADM

## 2024-04-23 RX ORDER — ONDANSETRON 2 MG/ML
INJECTION INTRAMUSCULAR; INTRAVENOUS PRN
Status: DISCONTINUED | OUTPATIENT
Start: 2024-04-23 | End: 2024-04-23 | Stop reason: SDUPTHER

## 2024-04-23 RX ORDER — SODIUM CHLORIDE 9 MG/ML
INJECTION, SOLUTION INTRAVENOUS CONTINUOUS
Status: DISCONTINUED | OUTPATIENT
Start: 2024-04-23 | End: 2024-04-23 | Stop reason: HOSPADM

## 2024-04-23 RX ORDER — LIDOCAINE HYDROCHLORIDE 20 MG/ML
INJECTION, SOLUTION EPIDURAL; INFILTRATION; INTRACAUDAL; PERINEURAL PRN
Status: DISCONTINUED | OUTPATIENT
Start: 2024-04-23 | End: 2024-04-23 | Stop reason: SDUPTHER

## 2024-04-23 RX ORDER — SODIUM CHLORIDE 9 MG/ML
INJECTION, SOLUTION INTRAVENOUS CONTINUOUS
Status: DISCONTINUED | OUTPATIENT
Start: 2024-04-23 | End: 2024-04-23 | Stop reason: SDUPTHER

## 2024-04-23 RX ORDER — GLUCAGON 1 MG
1 KIT INJECTION PRN
Status: DISCONTINUED | OUTPATIENT
Start: 2024-04-23 | End: 2024-04-25 | Stop reason: HOSPADM

## 2024-04-23 RX ORDER — FAMOTIDINE 20 MG/1
10 TABLET, FILM COATED ORAL DAILY
Status: DISCONTINUED | OUTPATIENT
Start: 2024-04-24 | End: 2024-04-25 | Stop reason: HOSPADM

## 2024-04-23 RX ORDER — ONDANSETRON 2 MG/ML
4 INJECTION INTRAMUSCULAR; INTRAVENOUS EVERY 6 HOURS PRN
Status: DISCONTINUED | OUTPATIENT
Start: 2024-04-23 | End: 2024-04-25 | Stop reason: HOSPADM

## 2024-04-23 RX ORDER — ACETAMINOPHEN 325 MG/1
650 TABLET ORAL EVERY 4 HOURS PRN
Status: DISCONTINUED | OUTPATIENT
Start: 2024-04-23 | End: 2024-04-25 | Stop reason: HOSPADM

## 2024-04-23 RX ORDER — FENTANYL CITRATE 50 UG/ML
INJECTION, SOLUTION INTRAMUSCULAR; INTRAVENOUS PRN
Status: DISCONTINUED | OUTPATIENT
Start: 2024-04-23 | End: 2024-04-23 | Stop reason: SDUPTHER

## 2024-04-23 RX ORDER — LIDOCAINE HYDROCHLORIDE 10 MG/ML
1 INJECTION, SOLUTION EPIDURAL; INFILTRATION; INTRACAUDAL; PERINEURAL
Status: DISCONTINUED | OUTPATIENT
Start: 2024-04-23 | End: 2024-04-23 | Stop reason: HOSPADM

## 2024-04-23 RX ORDER — SODIUM CHLORIDE 9 MG/ML
INJECTION, SOLUTION INTRAVENOUS PRN
Status: DISCONTINUED | OUTPATIENT
Start: 2024-04-23 | End: 2024-04-25 | Stop reason: HOSPADM

## 2024-04-23 RX ORDER — OXYCODONE HYDROCHLORIDE 5 MG/1
5 TABLET ORAL
Status: DISCONTINUED | OUTPATIENT
Start: 2024-04-23 | End: 2024-04-25 | Stop reason: HOSPADM

## 2024-04-23 RX ORDER — BUPIVACAINE HYDROCHLORIDE 5 MG/ML
INJECTION, SOLUTION EPIDURAL; INTRACAUDAL PRN
Status: DISCONTINUED | OUTPATIENT
Start: 2024-04-23 | End: 2024-04-23 | Stop reason: ALTCHOICE

## 2024-04-23 RX ORDER — DEXTROSE MONOHYDRATE 100 MG/ML
INJECTION, SOLUTION INTRAVENOUS CONTINUOUS PRN
Status: DISCONTINUED | OUTPATIENT
Start: 2024-04-23 | End: 2024-04-25 | Stop reason: HOSPADM

## 2024-04-23 RX ORDER — SODIUM CHLORIDE 0.9 % (FLUSH) 0.9 %
5-40 SYRINGE (ML) INJECTION PRN
Status: DISCONTINUED | OUTPATIENT
Start: 2024-04-23 | End: 2024-04-25 | Stop reason: HOSPADM

## 2024-04-23 RX ORDER — INSULIN LISPRO 100 [IU]/ML
0-8 INJECTION, SOLUTION INTRAVENOUS; SUBCUTANEOUS
Status: DISCONTINUED | OUTPATIENT
Start: 2024-04-23 | End: 2024-04-25 | Stop reason: HOSPADM

## 2024-04-23 RX ORDER — ASPIRIN 81 MG/1
81 TABLET ORAL 2 TIMES DAILY
Status: DISCONTINUED | OUTPATIENT
Start: 2024-04-24 | End: 2024-04-25 | Stop reason: HOSPADM

## 2024-04-23 RX ORDER — ALBUMIN, HUMAN INJ 5% 5 %
SOLUTION INTRAVENOUS PRN
Status: DISCONTINUED | OUTPATIENT
Start: 2024-04-23 | End: 2024-04-23 | Stop reason: SDUPTHER

## 2024-04-23 RX ORDER — ONDANSETRON 4 MG/1
4 TABLET, ORALLY DISINTEGRATING ORAL EVERY 6 HOURS PRN
Status: DISCONTINUED | OUTPATIENT
Start: 2024-04-23 | End: 2024-04-25 | Stop reason: HOSPADM

## 2024-04-23 RX ORDER — ROCURONIUM BROMIDE 10 MG/ML
INJECTION, SOLUTION INTRAVENOUS PRN
Status: DISCONTINUED | OUTPATIENT
Start: 2024-04-23 | End: 2024-04-23 | Stop reason: SDUPTHER

## 2024-04-23 RX ORDER — FENTANYL CITRATE 50 UG/ML
25 INJECTION, SOLUTION INTRAMUSCULAR; INTRAVENOUS EVERY 5 MIN PRN
Status: DISCONTINUED | OUTPATIENT
Start: 2024-04-23 | End: 2024-04-23

## 2024-04-23 RX ORDER — DEXTROSE MONOHYDRATE 100 MG/ML
INJECTION, SOLUTION INTRAVENOUS CONTINUOUS PRN
Status: DISCONTINUED | OUTPATIENT
Start: 2024-04-23 | End: 2024-04-23 | Stop reason: HOSPADM

## 2024-04-23 RX ORDER — POLYETHYLENE GLYCOL 3350 17 G/17G
17 POWDER, FOR SOLUTION ORAL DAILY PRN
Status: DISCONTINUED | OUTPATIENT
Start: 2024-04-23 | End: 2024-04-25 | Stop reason: HOSPADM

## 2024-04-23 RX ORDER — PROPOFOL 10 MG/ML
INJECTION, EMULSION INTRAVENOUS PRN
Status: DISCONTINUED | OUTPATIENT
Start: 2024-04-23 | End: 2024-04-23 | Stop reason: SDUPTHER

## 2024-04-23 RX ORDER — ETOMIDATE 2 MG/ML
INJECTION INTRAVENOUS PRN
Status: DISCONTINUED | OUTPATIENT
Start: 2024-04-23 | End: 2024-04-23 | Stop reason: SDUPTHER

## 2024-04-23 RX ORDER — NALOXONE HYDROCHLORIDE 0.4 MG/ML
INJECTION, SOLUTION INTRAMUSCULAR; INTRAVENOUS; SUBCUTANEOUS PRN
Status: DISCONTINUED | OUTPATIENT
Start: 2024-04-23 | End: 2024-04-25 | Stop reason: HOSPADM

## 2024-04-23 RX ORDER — SENNA AND DOCUSATE SODIUM 50; 8.6 MG/1; MG/1
1 TABLET, FILM COATED ORAL 2 TIMES DAILY
Status: DISCONTINUED | OUTPATIENT
Start: 2024-04-23 | End: 2024-04-25 | Stop reason: HOSPADM

## 2024-04-23 RX ADMIN — FAMOTIDINE 20 MG: 10 INJECTION, SOLUTION INTRAVENOUS at 08:29

## 2024-04-23 RX ADMIN — TRANEXAMIC ACID 1 G: 100 INJECTION, SOLUTION INTRAVENOUS at 09:10

## 2024-04-23 RX ADMIN — SODIUM CHLORIDE: 9 INJECTION, SOLUTION INTRAVENOUS at 08:29

## 2024-04-23 RX ADMIN — PHENYLEPHRINE HYDROCHLORIDE 100 MCG: 10 INJECTION INTRAVENOUS at 10:04

## 2024-04-23 RX ADMIN — ALBUMIN (HUMAN) 12.5 G: 12.5 SOLUTION INTRAVENOUS at 09:30

## 2024-04-23 RX ADMIN — ROCURONIUM BROMIDE 50 MG: 10 INJECTION, SOLUTION INTRAVENOUS at 08:45

## 2024-04-23 RX ADMIN — PHENYLEPHRINE HYDROCHLORIDE 100 MCG: 10 INJECTION INTRAVENOUS at 09:26

## 2024-04-23 RX ADMIN — FENTANYL CITRATE 25 MCG: 50 INJECTION INTRAMUSCULAR; INTRAVENOUS at 08:43

## 2024-04-23 RX ADMIN — ONDANSETRON 4 MG: 2 INJECTION INTRAMUSCULAR; INTRAVENOUS at 10:15

## 2024-04-23 RX ADMIN — PHENYLEPHRINE HYDROCHLORIDE 100 MCG: 10 INJECTION INTRAVENOUS at 09:44

## 2024-04-23 RX ADMIN — WATER 2000 MG: 1 INJECTION, SOLUTION INTRAMUSCULAR; INTRAVENOUS; SUBCUTANEOUS at 09:05

## 2024-04-23 RX ADMIN — SODIUM CHLORIDE, PRESERVATIVE FREE 10 ML: 5 INJECTION INTRAVENOUS at 08:07

## 2024-04-23 RX ADMIN — TRANEXAMIC ACID 1 G: 100 INJECTION, SOLUTION INTRAVENOUS at 10:15

## 2024-04-23 RX ADMIN — INSULIN LISPRO 1 UNITS: 100 INJECTION, SOLUTION INTRAVENOUS; SUBCUTANEOUS at 08:36

## 2024-04-23 RX ADMIN — LIDOCAINE HYDROCHLORIDE 40 MG: 20 INJECTION, SOLUTION EPIDURAL; INFILTRATION; INTRACAUDAL; PERINEURAL at 08:45

## 2024-04-23 RX ADMIN — MORPHINE SULFATE 1 MG: 2 INJECTION, SOLUTION INTRAMUSCULAR; INTRAVENOUS at 19:37

## 2024-04-23 RX ADMIN — SODIUM CHLORIDE: 9 INJECTION, SOLUTION INTRAVENOUS at 17:00

## 2024-04-23 RX ADMIN — PHENYLEPHRINE HYDROCHLORIDE 100 MCG: 10 INJECTION INTRAVENOUS at 09:35

## 2024-04-23 RX ADMIN — FENTANYL CITRATE 50 MCG: 50 INJECTION INTRAMUSCULAR; INTRAVENOUS at 09:20

## 2024-04-23 RX ADMIN — SODIUM CHLORIDE, PRESERVATIVE FREE 10 ML: 5 INJECTION INTRAVENOUS at 08:08

## 2024-04-23 RX ADMIN — SUGAMMADEX 200 MG: 100 INJECTION, SOLUTION INTRAVENOUS at 10:23

## 2024-04-23 RX ADMIN — PROPOFOL 50 MG: 10 INJECTION, EMULSION INTRAVENOUS at 09:20

## 2024-04-23 RX ADMIN — CEFAZOLIN 2000 MG: 1 INJECTION, POWDER, FOR SOLUTION INTRAMUSCULAR; INTRAVENOUS at 21:00

## 2024-04-23 RX ADMIN — ROCURONIUM BROMIDE 10 MG: 10 INJECTION, SOLUTION INTRAVENOUS at 10:00

## 2024-04-23 RX ADMIN — SODIUM CHLORIDE, PRESERVATIVE FREE 10 ML: 5 INJECTION INTRAVENOUS at 20:04

## 2024-04-23 RX ADMIN — SODIUM CHLORIDE: 9 INJECTION, SOLUTION INTRAVENOUS at 16:07

## 2024-04-23 RX ADMIN — FENTANYL CITRATE 25 MCG: 50 INJECTION INTRAMUSCULAR; INTRAVENOUS at 09:15

## 2024-04-23 RX ADMIN — ETOMIDATE 12 MG: 2 INJECTION, SOLUTION INTRAVENOUS at 08:45

## 2024-04-23 ASSESSMENT — PAIN - FUNCTIONAL ASSESSMENT
PAIN_FUNCTIONAL_ASSESSMENT: PREVENTS OR INTERFERES SOME ACTIVE ACTIVITIES AND ADLS
PAIN_FUNCTIONAL_ASSESSMENT: PREVENTS OR INTERFERES SOME ACTIVE ACTIVITIES AND ADLS

## 2024-04-23 ASSESSMENT — PAIN SCALES - PAIN ASSESSMENT IN ADVANCED DEMENTIA (PAINAD)
BODYLANGUAGE: RELAXED
CONSOLABILITY: NO NEED TO CONSOLE
TOTALSCORE: 0
TOTALSCORE: 0
CONSOLABILITY: NO NEED TO CONSOLE
CONSOLABILITY: NO NEED TO CONSOLE
FACIALEXPRESSION: SMILING OR INEXPRESSIVE
TOTALSCORE: 0
BODYLANGUAGE: RELAXED
TOTALSCORE: 0
CONSOLABILITY: NO NEED TO CONSOLE
FACIALEXPRESSION: FACIAL GRIMACING
FACIALEXPRESSION: SMILING OR INEXPRESSIVE
BREATHING: NORMAL
CONSOLABILITY: NO NEED TO CONSOLE
TOTALSCORE: 0
CONSOLABILITY: NO NEED TO CONSOLE
FACIALEXPRESSION: SMILING OR INEXPRESSIVE
TOTALSCORE: 0
BODYLANGUAGE: RELAXED
BREATHING: NORMAL
FACIALEXPRESSION: SMILING OR INEXPRESSIVE
BODYLANGUAGE: RELAXED
CONSOLABILITY: NO NEED TO CONSOLE
TOTALSCORE: 0
TOTALSCORE: 3
BODYLANGUAGE: RELAXED
BREATHING: NORMAL
BODYLANGUAGE: RELAXED
TOTALSCORE: 0
FACIALEXPRESSION: SMILING OR INEXPRESSIVE
BODYLANGUAGE: RELAXED
BREATHING: NORMAL
BREATHING: NORMAL
FACIALEXPRESSION: SMILING OR INEXPRESSIVE
BREATHING: NORMAL
BODYLANGUAGE: RELAXED
FACIALEXPRESSION: SMILING OR INEXPRESSIVE
BREATHING: NORMAL
TOTALSCORE: 0
CONSOLABILITY: NO NEED TO CONSOLE
CONSOLABILITY: NO NEED TO CONSOLE
BREATHING: NORMAL
FACIALEXPRESSION: SMILING OR INEXPRESSIVE
BODYLANGUAGE: TENSE, DISTRESSED PACING, FIDGETING
BODYLANGUAGE: RELAXED
BREATHING: NORMAL
BODYLANGUAGE: RELAXED
BREATHING: NORMAL
FACIALEXPRESSION: SMILING OR INEXPRESSIVE
TOTALSCORE: 0
BREATHING: NORMAL
FACIALEXPRESSION: SMILING OR INEXPRESSIVE

## 2024-04-23 ASSESSMENT — PAIN SCALES - GENERAL
PAINLEVEL_OUTOF10: 0
PAINLEVEL_OUTOF10: 7
PAINLEVEL_OUTOF10: 0
PAINLEVEL_OUTOF10: 0

## 2024-04-23 ASSESSMENT — PAIN DESCRIPTION - ORIENTATION: ORIENTATION: RIGHT

## 2024-04-23 ASSESSMENT — PAIN DESCRIPTION - ONSET: ONSET: GRADUAL

## 2024-04-23 ASSESSMENT — PAIN DESCRIPTION - FREQUENCY: FREQUENCY: INTERMITTENT

## 2024-04-23 ASSESSMENT — PAIN SCALES - WONG BAKER
WONGBAKER_NUMERICALRESPONSE: NO HURT
WONGBAKER_NUMERICALRESPONSE: HURTS A LITTLE BIT

## 2024-04-23 ASSESSMENT — PAIN DESCRIPTION - LOCATION
LOCATION: KNEE
LOCATION: KNEE

## 2024-04-23 ASSESSMENT — PAIN DESCRIPTION - DESCRIPTORS
DESCRIPTORS: DISCOMFORT
DESCRIPTORS: DISCOMFORT

## 2024-04-23 ASSESSMENT — PAIN DESCRIPTION - PAIN TYPE: TYPE: ACUTE PAIN;SURGICAL PAIN

## 2024-04-23 NOTE — PERIOP NOTE
TRANSFER - OUT REPORT:    Telephone report given to Chelsea Javed  being transferred to [facility] for routine post-op       Report consisted of patient's Situation, Background, Assessment and   Recommendations(SBAR).     Information from the following report(s) Surgery Report was reviewed with the receiving nurse.           Lines:   Peripheral IV 04/21/24 Left Antecubital (Active)   Site Assessment Clean, dry & intact 04/23/24 1625   Line Status Normal saline locked 04/23/24 1625   Line Care Connections checked and tightened 04/23/24 1625   Phlebitis Assessment No symptoms 04/23/24 1625   Infiltration Assessment 0 04/23/24 1625   Alcohol Cap Used No 04/23/24 1625   Dressing Status Intact 04/23/24 1625   Dressing Type Transparent 04/23/24 1625       Peripheral IV 04/21/24 Posterior;Right Forearm (Active)   Site Assessment Clean, dry & intact 04/23/24 1625   Line Status Normal saline locked 04/23/24 1625   Line Care Connections checked and tightened 04/23/24 1625   Phlebitis Assessment No symptoms 04/23/24 1625   Infiltration Assessment 0 04/23/24 1625   Alcohol Cap Used No 04/23/24 1625   Dressing Status Intact 04/23/24 1625   Dressing Type Transparent 04/23/24 1625        Opportunity for questions and clarification was provided.      Patient transported with:  O2 @ 2lpm and Tech

## 2024-04-23 NOTE — PROGRESS NOTES
[facility] Hospitalist Group  Progress Note    Patient: Jerome Javed Age: 89 y.o. : 10/18/1925 MR#: [id number] SSN: [id number]  Date/Time: 2024     Subjective: Patient lying in the bed, opens eyes on verbal commands but does not communicate or follow commands.  Patient just came back from the OR.  Per RN, this is patient's baseline.     Assessment/Plan:   Right distal femur fracture, post-ORIF 2024  Leukocytosis: No signs of infection, will monitor  Acute renal failure, due to decreased p.o. intake, will start IV fluids and monitor creatinine.  Patient already on Agustin, monitor I's/O  History of hypothyroidism-continue Synthroid  History of anemia-continue to monitor hemoglobin, transfuse for hemoglobin less than 7.  History of hypertension-controlled without any medications  History of dementia with failure to thrive  Advanced age  DVT prophylaxis-Lovenox  DNR    Discussed with the patient's DPOA granddaughter cross over the phone and explained about my above plan care.  Discussed about advance directives, she is very clear patient needs to be DNR.  Daughter does not want patient any escalation of care, she wants to keep patient comfortable.   Will also consult palliative care.      Case discussed with:  []Patient  [x]Family  []Nursing  []Case Management  DVT Prophylaxis:  []Lovenox  []Hep SQ  [x]SCDs  []Coumadin   []Eliquis/Xarelto     Objective:   VS: BP (!) 169/69   Pulse 83   Temp 98.2 °F (36.8 °C) (Oral)   Resp 14   Ht 1.524 m (5')   Wt 57.2 kg (126 lb 3.2 oz)   SpO2 100%   BMI 24.65 kg/m²    Tmax/24hrs: Temp (24hrs), Av.8 °F (36.6 °C), Min:97.4 °F (36.3 °C), Max:98.2 °F (36.8 °C)  IOBRIEF  Intake/Output Summary (Last 24 hours) at 2024 1700  Last data filed at 2024 1030  Gross per 24 hour   Intake 1250 ml   Output 375 ml   Net 875 ml       General: Sleepy, opens eyes on verbal commands.  Pulmonary:  CTA Bilaterally. No

## 2024-04-23 NOTE — OP NOTE
[address]  [address]                            OPERATIVE REPORT      PATIENT NAME: RANDALL SALGUERO              : 10/18/1925  MED REC NO: [id number]                       ROOM: OR  ACCOUNT NO: [id number]                       ADMIT DATE: 2024  PROVIDER: Donald Reinoso MD    DATE OF SERVICE:  2024    PREOPERATIVE DIAGNOSES:  Displaced comminuted supracondylar femur fracture.    POSTOPERATIVE DIAGNOSES:  Displaced comminuted supracondylar femur fracture.    PROCEDURES PERFORMED:  Open reduction and internal fixation of the right distal femur using the Synthes periarticular locking plate, cables, and also ViviGen bone grafting.    SURGEON:  Donald Reinoso MD    ASSISTANT:  JO ANN Boyer PA-C first assistant; Timothy Rick was the second assistant.    ANESTHESIA:  General.    ESTIMATED BLOOD LOSS:  Less than 200 mL.    SPECIMENS REMOVED:  No specimen.    INTRAOPERATIVE FINDINGS:  Same.     COMPLICATIONS:  No complications.    IMPLANTS:  As above-mentioned.    INDICATIONS:  Same.    DESCRIPTION OF PROCEDURE:  After general anesthetic was successfully induced, it was confirmed the patient did receive her antibiotics and TXA, and a time-out was performed.  The standard lateral approach to the femur itself.    We controlled hemostasis as best as possible.    Opened up the IT band and then delineated the fracture, kept periosteal stripping to minimum, irrigated out the fracture hematoma, freed up the fragments for reduction, removing the muscle from the fracture site and then with in-line traction we tentatively passed a few cables and then snugged them up and had a look at her reduction.  I thought we could get a little bit better length, so we readjusted it and done an anatomic reduction on AP and lateral images.  I was very pleased with this and then snugged up the cables and cut them.  We then applied the plate and then confirmed  with the C-arm the location of the plate, K-wire fixation distally and then 1 cortical screw proximally.  We then placed the rest of the locking screws distally and then worked our way up the plate with a combination of locking and cortical screws not making the plate too stiff with excellent fixation, lavaged out, and then we did the ViviGen bone grafting to the anterior distal comminuted area.    We then irrisepted and then routine closure.  Foot was warm and well perfused at the conclusion of the procedure.    Compartment soft and the patient brought to recovery room in stable condition after standard closure.    ANESTHESIOLOGIST:  ***        MD NATALIE WILDE/MAC  D:  04/23/2024 10:35:11  T:  04/23/2024 12:59:13  JOB #:  [id number]

## 2024-04-23 NOTE — PLAN OF CARE
Problem: Safety - Adult  Goal: Free from fall injury  Outcome: Progressing  Flowsheets (Taken 4/23/2024 1751)  Free From Fall Injury:   Instruct family/caregiver on patient safety   Based on caregiver fall risk screen, instruct family/caregiver to ask for assistance with transferring infant if caregiver noted to have fall risk factors     Problem: Pain  Goal: Verbalizes/displays adequate comfort level or baseline comfort level  Outcome: Progressing  Flowsheets (Taken 4/23/2024 1751)  Verbalizes/displays adequate comfort level or baseline comfort level:   Encourage patient to monitor pain and request assistance   Assess pain using appropriate pain scale   Implement non-pharmacological measures as appropriate and evaluate response   Administer analgesics based on type and severity of pain and evaluate response   Consider cultural and social influences on pain and pain management   Notify Licensed Independent Practitioner if interventions unsuccessful or patient reports new pain

## 2024-04-23 NOTE — BRIEF OP NOTE
Brief Postoperative Note      Patient: Jerome Javed  YOB: 1925  MRN: [id number]    Date of Procedure: 4/23/2024    Pre-Op Diagnosis Codes:     * Closed fracture of distal end of right femur, unspecified fracture morphology, initial encounter ([facility]) [S72.401A] with comminution    Post-Op Diagnosis: Same       Procedure(s):  OPEN REDUCTION INTERNAL FIXATION RIGHT DISTAL FEMUR AND ANY OTHER PROCEDURE SURGICALLY INDICATED    Surgeon(s):  Donald Reinoso MD    Assistant:  Surgical Assistant: Timothy Rick  Physician Assistant: Eric Boyer PA-C    Anesthesia: General    Estimated Blood Loss (mL): less than 100     Complications: None    Specimens:   * No specimens in log *    Implants:  Implant Name Type Inv. Item Serial No.  Lot No. LRB No. Used Action   CABLE SURG DIA1.7MM S STL HA CERCLAGE W/ CRMP [id number]] DEPUY SYNTHES [facility]] - [id number]  CABLE SURG DIA1.7MM S STL HA CERCLAGE W/ CRMP [id number]] DEPUY SYNTHES [facility]]  DEPUY SYNTHES [employer] [id number] Right 1 Implanted   CABLE SURG DIA1.7MM S STL HA CERCLAGE W/ CRMP [id number]] DEPUY SYNTHES [facility]] - [id number]  CABLE SURG DIA1.7MM S STL HA CERCLAGE W/ CRMP [id number]] DEPUY SYNTHES [facility]]  DEPUY SYNTHES [employer] [id number] Right 1 Implanted   CABLE SURG DIA1.7MM S STL HA CERCLAGE W/ CRMP [id number]] DEPUY SYNTHES [facility]] - [id number]  CABLE SURG DIA1.7MM S STL HA CERCLAGE W/ CRMP [id number]] DEPUY SYNTHES [facility]]  DEPUY SYNTHES [employer] [id number] Right 1 Implanted         Drains:   Urinary Catheter 04/21/24 (Active)   Catheter Indications Prolonged immobilization (e.g. unstable thoracic or lumbar spine, multiple traumatic injuries such as pelvic fractures) 04/23/24 0437   Site Assessment No urethral drainage 04/23/24 0437   Urine Color Jennifer 04/23/24 0437   Urine Appearance Clear 04/23/24 0437   Urine Odor Other (Comment) 04/22/24 2126   Collection Container Standard 04/23/24 0437   Securement Method Securing device (Describe) 04/23/24 0437   Catheter Care

## 2024-04-23 NOTE — ANESTHESIA POSTPROCEDURE EVALUATION
Department of Anesthesiology  Postprocedure Note    Patient: Jerome Javed  MRN: [id number]  YOB: 1925  Date of evaluation: 4/23/2024    Procedure Summary       Date: 04/23/24 Room / Location: [facility] MAIN 07 / [facility] MAIN OR    Anesthesia Start: 0840 Anesthesia Stop: 1046    Procedure: OPEN REDUCTION INTERNAL FIXATION RIGHT DISTAL FEMUR (Right: Leg Upper) Diagnosis:       Closed fracture of distal end of right femur, unspecified fracture morphology, initial encounter ([facility])      (Closed fracture of distal end of right femur, unspecified fracture morphology, initial encounter ([facility]) [S72.401A])    Surgeons: Donald Reinoso MD Responsible Provider: Jono Mitchell Jr., MD    Anesthesia Type: general ASA Status: 3            Anesthesia Type: No value filed.    Nola Phase I: Nola Score: 8    Nola Phase II:      Anesthesia Post Evaluation    Patient location during evaluation: bedside  Airway patency: patent  Cardiovascular status: hemodynamically stable  Respiratory status: acceptable  Hydration status: stable  Pain management: adequate    No notable events documented.

## 2024-04-23 NOTE — ANESTHESIA PRE PROCEDURE
History:    Social History     Tobacco Use   • Smoking status: Never   • Smokeless tobacco: Never   Substance Use Topics   • Alcohol use: No                                Counseling given: Not Answered      Vital Signs (Current):   Vitals:    04/22/24 1512 04/22/24 1944 04/22/24 2323 04/23/24 0437   BP: 122/60 124/65 124/66 109/62   Pulse: 83 79 76 75   Resp: 16 16 16 16   Temp: 97.8 °F (36.6 °C) 97.9 °F (36.6 °C) 97.7 °F (36.5 °C) 97.9 °F (36.6 °C)   TempSrc: Axillary Axillary Axillary Axillary   SpO2: 97% 97% 97% 95%   Weight:       Height:                                                  BP Readings from Last 3 Encounters:   04/23/24 109/62   09/27/23 120/86   02/23/23 (!) 163/88       NPO Status: Time of last liquid consumption: 1900                        Time of last solid consumption: 1900                        Date of last liquid consumption: 04/21/24                        Date of last solid food consumption: 04/21/24    BMI:   Wt Readings from Last 3 Encounters:   04/22/24 57.2 kg (126 lb 3.2 oz)   09/27/23 57.3 kg (126 lb 6.4 oz)     Body mass index is 24.65 kg/m².    CBC:   Lab Results   Component Value Date/Time    WBC 17.5 04/23/2024 12:47 AM    RBC 3.09 04/23/2024 12:47 AM    HGB 9.0 04/23/2024 12:47 AM    HCT 29.7 04/23/2024 12:47 AM    MCV 96.1 04/23/2024 12:47 AM    RDW 14.6 04/23/2024 12:47 AM     04/23/2024 12:47 AM       CMP:   Lab Results   Component Value Date/Time     04/23/2024 12:47 AM    K 4.9 04/23/2024 12:47 AM     04/23/2024 12:47 AM    CO2 28 04/23/2024 12:47 AM    BUN 48 04/23/2024 12:47 AM    CREATININE 2.10 04/23/2024 12:47 AM    GFRAA >60 01/29/2021 03:50 AM    AGRATIO 0.6 04/21/2024 09:50 AM    AGRATIO 0.8 01/26/2021 10:36 PM    LABGLOM 21 04/23/2024 12:47 AM    GLUCOSE 202 04/23/2024 12:47 AM    PROT 7.2 04/21/2024 09:50 AM    CALCIUM 8.7 04/23/2024 12:47 AM    BILITOT 0.5 04/21/2024 09:50 AM    ALKPHOS 109 04/21/2024 09:50 AM    ALKPHOS 120 01/26/2021 10:36

## 2024-04-23 NOTE — PERIOP NOTE
TRANSFER - IN REPORT:    Verbal report received from TIBURCIO Singer  on Omelia W Cradle  being received from Room 506 for ordered procedure      Report consisted of patient's Situation, Background, Assessment and   Recommendations(SBAR).     Information from the following report(s) Nurse Handoff Report was reviewed with the receiving nurse.    Opportunity for questions and clarification was provided.      Assessment completed upon patient's arrival to unit and care assumed.

## 2024-04-23 NOTE — PROGRESS NOTES
Pharmacy Note - Renal dose adjustment made per P/T protocol    Original order:  Cefazolin 2 gm IVP q 8 h x 2 doses    Estimated Creatinine Clearance: 12 mL/min (A) (based on SCr of 2.1 mg/dL (H)).    Recent Labs     04/21/24  0950 04/22/24  0440 04/23/24  0047   BUN 23* 33* 48*   CREATININE 1.12 1.81* 2.10*       Renally adjusted order:  Cefazolin 2 gm IVP q 12 h x 2 doses    Please call inpatient pharmacy with any questions.    Thank you,  FAITH WILL [facility] [place]  4/23/2024 4:37 PM

## 2024-04-23 NOTE — CONSULTS
[address]  [address]                              CONSULTATION      PATIENT NAME: RANDALL SALGUERO              : 10/18/1925  MED REC NO: [id number]                       ROOM: OR  ACCOUNT NO: [id number]                       ADMIT DATE: 2024  PROVIDER: Donald Reinoso MD    DATE OF SERVICE:  2024    ATTENDING PHYSICIAN:  Marlon Dickerson MD    REASON FOR CONSULTATION:  Comminuted right distal femoral fracture.    HISTORY OF PRESENT ILLNESS:  The patient is a nursing home patient, who is 89, history of dementia and hypertension, diabetes, and had a fall after a shower a couple of days before.  She was somewhat ambulatory from bed to chair and to the shower.  The granddaughter helps give the history as well and the granddaughter is quite insistent she would like to have surgery for pain control, especially at the nursing home.    Dr Rasmussen was on-call and asked me to get involved with the patient's care.    The patient had a negative urinalysis and negative chest x-ray as well.    PAST MEDICAL HISTORY:  Includes breast cancer, diabetes, hypertension, hypothyroidism, appendectomy, breast surgery, and mastectomy in the past.    FAMILY HISTORY:  No family history is available.    SOCIAL HISTORY:  None.    ALLERGIES:  NO KNOWN DRUG ALLERGIES.      PHYSICAL EXAMINATION:  VITALS:  Stable.    GENERAL:  She is confused.  Actually, she does not look sick.  MUSCULOSKELETAL:  Moves both upper extremities adequately.  Left lower extremity is noncontributory.  The right lower extremity has some bruising medially.  There are no open holes in the skin.  She can wiggle her toes and she has warm and well perfused foot as well.    RADIOGRAPHIC IMAGING:  I did review her x-rays.  Comminuted distal femoral fracture.    IMPRESSION AND PLAN:  I had a lengthy discussion with the family, the granddaughter specifically and explained that it  considered high risk surgery.  She could die during surgery or in the postoperative period.  I did offer to try a course of nonoperative management to see how things go and the granddaughter is quite insistent she wanted it stabilized for pain control and I would have to agree that I think that long-term wise, she will be more comfortable mobilized with an ORIF.  Therefore, all risks and benefits including the sudden death, infection, DVT, loss of fixation, chronic pain, and other complications were all discussed and the patient and family elected to proceed.        MD NATALIE WILDE/MAC  D:  04/23/2024 10:31:38  T:  04/23/2024 11:47:30  JOB #:  [id number]

## 2024-04-23 NOTE — INTERVAL H&P NOTE
Update History & Physical    The patient's History and Physical of April 23, 2024 was reviewed with the patient and I examined the patient. There was no change. The surgical site was confirmed by the patient and me.     Plan: The risks, benefits, expected outcome, and alternative to the recommended procedure have been discussed with the patient. Patient understands and wants to proceed with the procedure.     Electronically signed by SARAH DAVENPORT MD on 4/23/2024 at 8:07 AM

## 2024-04-24 ENCOUNTER — HOSPICE ADMISSION (OUTPATIENT)
Age: 89
End: 2024-04-24
Payer: MEDICARE

## 2024-04-24 PROBLEM — Z71.89 GOALS OF CARE, COUNSELING/DISCUSSION: Status: ACTIVE | Noted: 2024-04-24

## 2024-04-24 PROBLEM — R53.81 DEBILITY: Status: ACTIVE | Noted: 2024-04-24

## 2024-04-24 LAB
ANION GAP SERPL CALC-SCNC: 4 MMOL/L (ref 3–18)
BASOPHILS # BLD: 0 K/UL (ref 0–0.1)
BASOPHILS NFR BLD: 0 % (ref 0–2)
BUN SERPL-MCNC: 47 MG/DL (ref 7–18)
BUN/CREAT SERPL: 27 (ref 12–20)
CALCIUM SERPL-MCNC: 8.5 MG/DL (ref 8.5–10.1)
CHLORIDE SERPL-SCNC: 112 MMOL/L (ref 100–111)
CO2 SERPL-SCNC: 26 MMOL/L (ref 21–32)
CREAT SERPL-MCNC: 1.71 MG/DL (ref 0.6–1.3)
DIFFERENTIAL METHOD BLD: ABNORMAL
EOSINOPHIL # BLD: 0.3 K/UL (ref 0–0.4)
EOSINOPHIL NFR BLD: 1 % (ref 0–5)
ERYTHROCYTE [DISTWIDTH] IN BLOOD BY AUTOMATED COUNT: 14.6 % (ref 11.6–14.5)
GLUCOSE BLD STRIP.AUTO-MCNC: 127 MG/DL (ref 70–110)
GLUCOSE BLD STRIP.AUTO-MCNC: 221 MG/DL (ref 70–110)
GLUCOSE BLD STRIP.AUTO-MCNC: 231 MG/DL (ref 70–110)
GLUCOSE BLD STRIP.AUTO-MCNC: 92 MG/DL (ref 70–110)
GLUCOSE SERPL-MCNC: 208 MG/DL (ref 74–99)
HCT VFR BLD AUTO: 25.6 % (ref 35–45)
HGB BLD-MCNC: 7.6 G/DL (ref 12–16)
IMM GRANULOCYTES # BLD AUTO: 0 K/UL (ref 0–0.04)
IMM GRANULOCYTES NFR BLD AUTO: 0 % (ref 0–0.5)
LYMPHOCYTES # BLD: 1 K/UL (ref 0.9–3.6)
LYMPHOCYTES NFR BLD: 3 % (ref 21–52)
MAGNESIUM SERPL-MCNC: 2.3 MG/DL (ref 1.6–2.6)
MCH RBC QN AUTO: 29 PG (ref 24–34)
MCHC RBC AUTO-ENTMCNC: 29.7 G/DL (ref 31–37)
MCV RBC AUTO: 97.7 FL (ref 78–100)
MONOCYTES # BLD: 2.2 K/UL (ref 0.05–1.2)
MONOCYTES NFR BLD: 7 % (ref 3–10)
NEUTS BAND NFR BLD MANUAL: 3 %
NEUTS SEG # BLD: 28.4 K/UL (ref 1.8–8)
NEUTS SEG NFR BLD: 86 % (ref 40–73)
NRBC # BLD: 0.07 K/UL (ref 0–0.01)
NRBC BLD-RTO: 0.2 PER 100 WBC
PLATELET # BLD AUTO: 260 K/UL (ref 135–420)
PLATELET COMMENT: ABNORMAL
PMV BLD AUTO: 10.5 FL (ref 9.2–11.8)
POTASSIUM SERPL-SCNC: 4.5 MMOL/L (ref 3.5–5.5)
PROCALCITONIN SERPL-MCNC: 0.48 NG/ML
RBC # BLD AUTO: 2.62 M/UL (ref 4.2–5.3)
RBC MORPH BLD: ABNORMAL
RBC MORPH BLD: ABNORMAL
SODIUM SERPL-SCNC: 142 MMOL/L (ref 136–145)
WBC # BLD AUTO: 31.9 K/UL (ref 4.6–13.2)

## 2024-04-24 PROCEDURE — 80048 BASIC METABOLIC PNL TOTAL CA: CPT

## 2024-04-24 PROCEDURE — 6370000000 HC RX 637 (ALT 250 FOR IP): Performed by: PHYSICIAN ASSISTANT

## 2024-04-24 PROCEDURE — 1100000000 HC RM PRIVATE

## 2024-04-24 PROCEDURE — 85025 COMPLETE CBC W/AUTO DIFF WBC: CPT

## 2024-04-24 PROCEDURE — 82962 GLUCOSE BLOOD TEST: CPT

## 2024-04-24 PROCEDURE — 2580000003 HC RX 258: Performed by: ORTHOPAEDIC SURGERY

## 2024-04-24 PROCEDURE — 84145 PROCALCITONIN (PCT): CPT

## 2024-04-24 PROCEDURE — 6360000002 HC RX W HCPCS: Performed by: PHYSICIAN ASSISTANT

## 2024-04-24 PROCEDURE — 36415 COLL VENOUS BLD VENIPUNCTURE: CPT

## 2024-04-24 PROCEDURE — 6370000000 HC RX 637 (ALT 250 FOR IP): Performed by: ORTHOPAEDIC SURGERY

## 2024-04-24 PROCEDURE — 99221 1ST HOSP IP/OBS SF/LOW 40: CPT | Performed by: NURSE PRACTITIONER

## 2024-04-24 PROCEDURE — 92610 EVALUATE SWALLOWING FUNCTION: CPT

## 2024-04-24 PROCEDURE — 83735 ASSAY OF MAGNESIUM: CPT

## 2024-04-24 PROCEDURE — 2580000003 HC RX 258: Performed by: PHYSICIAN ASSISTANT

## 2024-04-24 PROCEDURE — 99233 SBSQ HOSP IP/OBS HIGH 50: CPT | Performed by: HOSPITALIST

## 2024-04-24 PROCEDURE — 6360000002 HC RX W HCPCS: Performed by: ORTHOPAEDIC SURGERY

## 2024-04-24 PROCEDURE — 94761 N-INVAS EAR/PLS OXIMETRY MLT: CPT

## 2024-04-24 RX ORDER — MORPHINE SULFATE 100 MG/5ML
5 SOLUTION ORAL EVERY 4 HOURS PRN
Qty: 30 ML | Refills: 0 | Status: SHIPPED | OUTPATIENT
Start: 2024-04-24 | End: 2024-04-27

## 2024-04-24 RX ORDER — POLYETHYLENE GLYCOL 3350 17 G/17G
17 POWDER, FOR SOLUTION ORAL DAILY PRN
Qty: 527 G | Refills: 1 | Status: SHIPPED | OUTPATIENT
Start: 2024-04-24 | End: 2024-05-24

## 2024-04-24 RX ORDER — SENNA AND DOCUSATE SODIUM 50; 8.6 MG/1; MG/1
1 TABLET, FILM COATED ORAL 2 TIMES DAILY
Qty: 60 TABLET | Refills: 0 | Status: SHIPPED | OUTPATIENT
Start: 2024-04-24

## 2024-04-24 RX ORDER — LORAZEPAM 2 MG/ML
0.5 CONCENTRATE ORAL EVERY 4 HOURS PRN
Qty: 30 ML | Refills: 0 | Status: SHIPPED | OUTPATIENT
Start: 2024-04-24 | End: 2024-05-08

## 2024-04-24 RX ORDER — ACETAMINOPHEN 650 MG/1
650 SUPPOSITORY RECTAL EVERY 4 HOURS PRN
Qty: 10 SUPPOSITORY | Refills: 0 | Status: SHIPPED | OUTPATIENT
Start: 2024-04-24

## 2024-04-24 RX ORDER — ASPIRIN 81 MG/1
81 TABLET ORAL 2 TIMES DAILY
Qty: 30 TABLET | Refills: 3 | Status: SHIPPED | OUTPATIENT
Start: 2024-04-24

## 2024-04-24 RX ORDER — BISACODYL 10 MG
10 SUPPOSITORY, RECTAL RECTAL DAILY PRN
Qty: 10 SUPPOSITORY | Refills: 0 | Status: SHIPPED | OUTPATIENT
Start: 2024-04-24 | End: 2024-05-24

## 2024-04-24 RX ADMIN — MORPHINE SULFATE 1 MG: 2 INJECTION, SOLUTION INTRAMUSCULAR; INTRAVENOUS at 15:40

## 2024-04-24 RX ADMIN — CEFAZOLIN 2000 MG: 1 INJECTION, POWDER, FOR SOLUTION INTRAMUSCULAR; INTRAVENOUS at 20:31

## 2024-04-24 RX ADMIN — SODIUM CHLORIDE, PRESERVATIVE FREE 10 ML: 5 INJECTION INTRAVENOUS at 19:56

## 2024-04-24 RX ADMIN — INSULIN LISPRO 5 UNITS: 100 INJECTION, SOLUTION INTRAVENOUS; SUBCUTANEOUS at 08:25

## 2024-04-24 RX ADMIN — SODIUM CHLORIDE, PRESERVATIVE FREE 10 ML: 5 INJECTION INTRAVENOUS at 08:41

## 2024-04-24 RX ADMIN — CEFAZOLIN 2000 MG: 1 INJECTION, POWDER, FOR SOLUTION INTRAMUSCULAR; INTRAVENOUS at 08:31

## 2024-04-24 RX ADMIN — INSULIN LISPRO 7 UNITS: 100 INJECTION, SOLUTION INTRAVENOUS; SUBCUTANEOUS at 11:31

## 2024-04-24 RX ADMIN — FAMOTIDINE 10 MG: 20 TABLET ORAL at 08:26

## 2024-04-24 RX ADMIN — LEVOTHYROXINE SODIUM 75 MCG: 75 TABLET ORAL at 06:10

## 2024-04-24 RX ADMIN — ASPIRIN 81 MG: 81 TABLET, COATED ORAL at 08:26

## 2024-04-24 RX ADMIN — SENNOSIDES AND DOCUSATE SODIUM 1 TABLET: 50; 8.6 TABLET ORAL at 08:26

## 2024-04-24 RX ADMIN — SERTRALINE HYDROCHLORIDE 25 MG: 25 TABLET ORAL at 08:26

## 2024-04-24 RX ADMIN — MORPHINE SULFATE 1 MG: 2 INJECTION, SOLUTION INTRAMUSCULAR; INTRAVENOUS at 19:53

## 2024-04-24 ASSESSMENT — PAIN SCALES - WONG BAKER
WONGBAKER_NUMERICALRESPONSE: NO HURT
WONGBAKER_NUMERICALRESPONSE: NO HURT
WONGBAKER_NUMERICALRESPONSE: HURTS A LITTLE BIT
WONGBAKER_NUMERICALRESPONSE: NO HURT
WONGBAKER_NUMERICALRESPONSE: HURTS A LITTLE BIT
WONGBAKER_NUMERICALRESPONSE: HURTS A LITTLE BIT

## 2024-04-24 ASSESSMENT — PAIN SCALES - GENERAL: PAINLEVEL_OUTOF10: 2

## 2024-04-24 ASSESSMENT — PAIN DESCRIPTION - ONSET
ONSET: UNABLE TO COMMUNICATE
ONSET: UNABLE TO COMMUNICATE
ONSET: ON-GOING
ONSET: UNABLE TO COMMUNICATE
ONSET: ON-GOING
ONSET: UNABLE TO COMMUNICATE

## 2024-04-24 ASSESSMENT — PAIN DESCRIPTION - LOCATION
LOCATION: OTHER (COMMENT)
LOCATION: KNEE
LOCATION: KNEE
LOCATION: LEG
LOCATION: KNEE
LOCATION: OTHER (COMMENT)

## 2024-04-24 ASSESSMENT — PAIN DESCRIPTION - PAIN TYPE
TYPE: SURGICAL PAIN
TYPE: SURGICAL PAIN
TYPE: ACUTE PAIN;SURGICAL PAIN
TYPE: ACUTE PAIN
TYPE: SURGICAL PAIN
TYPE: ACUTE PAIN

## 2024-04-24 ASSESSMENT — PAIN DESCRIPTION - FREQUENCY
FREQUENCY: INTERMITTENT
FREQUENCY: OTHER (COMMENT)
FREQUENCY: INTERMITTENT
FREQUENCY: OTHER (COMMENT)
FREQUENCY: OTHER (COMMENT)
FREQUENCY: INTERMITTENT

## 2024-04-24 ASSESSMENT — PAIN - FUNCTIONAL ASSESSMENT

## 2024-04-24 ASSESSMENT — PAIN DESCRIPTION - DIRECTION
RADIATING_TOWARDS: UTA

## 2024-04-24 ASSESSMENT — PAIN DESCRIPTION - DESCRIPTORS
DESCRIPTORS: PATIENT UNABLE TO DESCRIBE
DESCRIPTORS: PATIENT UNABLE TO DESCRIBE
DESCRIPTORS: ACHING
DESCRIPTORS: OTHER (COMMENT)
DESCRIPTORS: OTHER (COMMENT);PATIENT UNABLE TO DESCRIBE
DESCRIPTORS: ACHING

## 2024-04-24 ASSESSMENT — PAIN DESCRIPTION - ORIENTATION
ORIENTATION: RIGHT
ORIENTATION: OTHER (COMMENT)
ORIENTATION: OTHER (COMMENT)
ORIENTATION: RIGHT

## 2024-04-24 NOTE — CARE COORDINATION
CM  confirmed with Lyn from [facility] , they are able to return. patient transportation scheduled for 10AM to [facility] and rehab    Updated Elizabeth with hospice and Dr Frey with transport time.     Discharge packet placed in bedside chart.   Bedside nurse Rico notified of discharge plan, patients medications need to picked up from outpatient pharmacy

## 2024-04-24 NOTE — DISCHARGE SUMMARY
Transfer Summary    Patient: Jerome Javed MRN: [id number]  CSN: [id number]    YOB: 1925  Age: 89 y.o.  Sex: female    DOA: 4/21/2024 LOS:  LOS: 4 days   Discharge Date: 4/25/2024     Disposition: Transfer to SNF with hospice care    Admission Diagnoses: Other fracture of right femur, initial encounter for closed fracture (HCC) [S72.8X1A]    Discharge Diagnoses:    Right distal femur fracture, post-ORIF 4/23/2024  Acute on chronic encephalopathy  Leukocytosis:  Dysphagia  Acute renal failure, due to decreased p.o. intake  Hypothyroidism  History of anemia  History of hypertension  History of dementia with failure to thrive   Advanced age    Discharge Condition: Stable    PHYSICAL EXAM  Visit Vitals  /64   Pulse 79   Temp 98 °F (36.7 °C) (Axillary)   Resp 16   Ht 1.524 m (5')   Wt 57.2 kg (126 lb 3.2 oz)   SpO2 99%   BMI 24.65 kg/m²       General: Sleepy, opens eyes on verbal commands.  Pulmonary:  CTA Bilaterally. No Wheezing/Rales.  Cardiovascular: Regular rate and Rhythm.  GI:  Soft, Non distended, Non tender. + Bowel sounds.  Extremities: Right leg in immobilizer  Psych: Not anxious or agitated.  Neurologic: Sleepy, opens eyes on verbal commands, does not follow commands                                Hospital Course:   Jerome Javed is a 89 y.o. female with a PMHx of DM, HTN, Hypothyroid, dementia who presented to the ED from SNF with right left pain after fall in the shower 2 days ago. She had an XR last night and results came back this morning. Patient is unable to provide any information. Patient's POA (granddaughter) confirms that patient is DNR and is comfort measures only at her facility. She wishes for patient to have surgery to help with pain control.      In the ED, VSS. Labs with , Cr 1.12, WBC 15.1, hgb 8.7 (was 11.1 last September), UA negative. XR with comminuted fx of the distal femur. CXR negative. Ortho was consulted.     The patient was admitted to hospital, Ortho  acute cardiopulmonary  disease.        Discharge Medications:    Current Discharge Medication List             Details   acetaminophen (TYLENOL) 650 MG suppository Place 1 suppository rectally every 4 hours as needed for Fever  Qty: 10 suppository, Refills: 0      bisacodyl (DULCOLAX) 10 MG suppository Place 1 suppository rectally daily as needed for Constipation  Qty: 10 suppository, Refills: 0      hyoscyamine (LEVSIN/SL) 125 MCG sublingual tablet Place 1 tablet under the tongue every 6 hours as needed for Cramping  Qty: 10 tablet, Refills: 0      morphine sulfate 20 MG/ML concentrated oral solution Take 0.25 mLs by mouth every 4 hours as needed for Pain for up to 3 days. Max Daily Amount: 30 mg  Qty: 30 mL, Refills: 0    Comments: Reduce doses taken as pain becomes manageable  Associated Diagnoses: Hospice care patient      LORazepam (ATIVAN) 2 MG/ML concentrated solution Take 0.25 mLs by mouth every 4 hours as needed for Agitation, Anxiety or Shortness of Breath for up to 14 days. Max Daily Amount: 3 mg  Qty: 30 mL, Refills: 0    Associated Diagnoses: Hospice care patient      aspirin 81 MG EC tablet Take 1 tablet by mouth in the morning and at bedtime  Qty: 30 tablet, Refills: 3      polyethylene glycol (GLYCOLAX) 17 g packet Take 1 packet by mouth daily as needed for Constipation  Qty: 527 g, Refills: 1      sennosides-docusate sodium (SENOKOT-S) 8.6-50 MG tablet Take 1 tablet by mouth 2 times daily  Qty: 60 tablet, Refills: 0                Details   famotidine (PEPCID) 20 MG tablet Take 1 tablet by mouth once      hydrOXYzine HCl (ATARAX) 25 MG tablet Take 1 tablet by mouth daily Take one tab po daily at night for urticaria      levothyroxine (SYNTHROID) 75 MCG tablet Take 1 tablet by mouth Daily      sertraline (ZOLOFT) 25 MG tablet Take 1 tablet by mouth in the morning and at bedtime      ergocalciferol (ERGOCALCIFEROL) 1.25 MG (18100 UT) capsule Take by mouth every 7 days                 DIET:   Comfort

## 2024-04-24 NOTE — PLAN OF CARE
1602: Pt is transitioning to hospice. Will sign off. Please re-consult if needed.      Problem: SLP Adult - Impaired Swallowing  Goal: By Discharge: Advance to least restrictive diet without signs or symptoms of aspiration for planned discharge setting.  See evaluation for individualized goals.  Description:     Patient will:  1. Tolerate PO trials with 0 s/s overt distress in 4/5 trials  2. Utilize compensatory swallow strategies/maneuvers (decrease bite/sip, size/rate, alt. liq/sol) with min cues in 4/5 trials  3. Perform oral-motor/laryngeal exercises to increase oropharyngeal swallow function with min cues  4. Complete an objective swallow study (i.e., MBSS) to assess swallow integrity, r/o aspiration, and determine of safest LRD, min A    Recommend:   NPO with alternate means of nutrition/hydration vs comfort feeds   Strict aspiration precautions (HOB >30 degrees at all times)  Oral care appears to be unsafe at this time as pt is demonstrating tonic bite    Outcome: Progressing     SPEECH LANGUAGE PATHOLOGY BEDSIDE SWALLOW EVALUATION    Patient: Jerome Javed (89 y.o. female)  Date: 4/24/2024  Primary Diagnosis: Other fracture of right femur, initial encounter for closed fracture (HCC) [S72.8X1A]  Procedure(s) (LRB):  OPEN REDUCTION INTERNAL FIXATION RIGHT DISTAL FEMUR (Right) 1 Day Post-Op   Precautions: Aspiration  PLOF: As per H&P  ASSESSMENT:  Pt seen for dysphagia evaluation 1 day post op ortho surgery. She was alert upon SLP arrival. Pt tolerated small tsp trials of thin; however, she demonstrated a very strong tonic bite reflex which broke the spoon. Pieces of plastic required SLP and RN to remove via max cues/toothette. Per nursing: pt with tonic bite during oral care and >90% of oral meds remaining in oral cavity despite max cues to clear. Suspect current presentation may be secondary to age related cognitive decline s/p surgery with pmhx of CVA. Pt demo inadequate sensory/motor awareness/strength

## 2024-04-24 NOTE — CARE COORDINATION
Requested Case Management specialist to assist with transportation to [facility].  Address is [facility] [address]  [facility] [address]   and phone number is   Patient will require BLS transport.   Pt requires Stretcher If stretcher, reason: New Hospice, Other fracture of right femur, initial encounter for closed fracture     Patient is currently requiring oxygen Yes 2 L  Height: 5FT   Weight: 126 LBS  Pt is on isolation: No  Is the pt ready now? NO   Requested time: 4/25/2024 @11AM   PCS Faxed: NO  Insurance verified on face sheet: YES  Auth needed for transport:   CM completed PCS/ Envelope and placed on chart.

## 2024-04-24 NOTE — ACP (ADVANCE CARE PLANNING)
Advance Care Planning     Palliative Team Advance Care Planning (ACP) Conversation    Date of Conversation: 04/24/24    Individuals present for the conversation: Patient. Palliative team placed call to Healthcare agent per Jacqueline richmond. Number was not available and no message was able to be left at [phone].      ACP documents on file prior to discussion:  -Portable DNR was located on care everywhere and scanned into EMR.     Previously completed document/s not on file:  Advance Medical Directive  document was completed previously but it is not available for review. This writer requested a copy of the document for patient's chart.     Healthcare Decision Maker:    Primary Decision Maker (Active): Jacqueline Huggins - Grandchild - [phone]     Conversation Summary:  Ms Jerome Javed is a 89 year old female with a medical h/o Diabetes Mellitus Type 2 requiring Insulin, Chronic Systolic Congestive Heart Failure, Cerebrovascular Accident, dementia, hypothyroid. Patient was admitted from her nursing facility after a fall which occurred after her shower per notes.  Patient sustained a Displaced comminuted supracondylar femur fracture. Patient's POA (granddaughter) confirms that patient is DNR and is comfort measures only at her facility. She wishes for patient to have surgery to help with pain control. Patient underwent  Open reduction and internal fixation of the right distal femur using the Synthes periarticular locking plate, cables, and also ViviGen bone grafting on 4/23/2024.   Palliative Medicine was consulted for goals of care. Per notes christian Jacqueline Huggins is MPOA.  DDNR form verbally completed in 2022. Palliative NP has spoke with Attending MD who shared Ms. Huggins in interested in hospice services at discharge back at the nursing facility. This team made attempt to call but was unable to reach Ms. Huggins.     Resuscitation Status:   Code Status: DNR     Documentation Completed:  -No new documents  completed.    I spent 40 minutes with the patient and/or surrogate decision maker discussing the patient's wishes and goals.      Kelli HAUSERN, RN, PN  Palliative Medicine Inpatient RN  Palliative COPE Line: [phone]    Kelli Hedrick, RN

## 2024-04-24 NOTE — PROGRESS NOTES
1515 Informed by KRISTOFER Nicholson that  Hospice is family's preferred agency.  Reached out to patient's granddaughter/ Primary decision maker, Jacqueline Huggins, by phone.  She talked about patient's history of dementia, behavioral issues, and recent fall.  Discussed [facility] philosophy, services, criteria, and IDT.  She expressed understanding.  Per granddaughter, patient has lived at [facility]/ for almost 4 years.  Granddaughter wishes for patient to return to [facility] with hospice support.  She is agreeable to discharge tomorrow 4/25 at 11am and she agreed to sign hospice consents electronically; email address obtained.  Provided with 24/7 contact information.      9092 KRISTOFER Nicholson made aware.  KRISTOFER to arrange transport for 11am tomorrow (4/25). Dr. Frey notified via [employer].    Pt/family pursuing hospice: Yes  Admission dx approved by Hospice Medical Director: (Pending)  Anticipated hospital d/c date: 4/25/2024  Discussion occurred with patient and/or family about preference of hospitalization once admitted to hospice: Yes  Reviewed and discussed hospice philosophy and keeping the patient comfortable in their residence: Yes  Discussion with patient/family regarding around the clock caregiver in place due to patient safety in the home once discharged: Yes     DME: Oxygen will be delivered on 4/24.  Order  [id number]     Provider:  Please send scripts for comfort medications and home medications (if needed) to the outpatient pharmacy to be filled and sent home with the pt at discharge.     1. Morphine concentrate 20mg/ml  Give 0.5ml po/sl every 4 hours PRN for pain/air hunger  Quantity 30ml    2. Lorazepam oral solution 2mg/ml   Give 0.5ml po/sl every 4 hrs PRN anxiety/agitation   Quantity 30 ml    3. Hyoscyamine 0.125mg tablets  Give 1 tab po/sl every 6 hours PRN terminal congestion  Quantity 10 tabs.      4.  Bisacodyl Suppository 10mg  Give one suppository rectally every day PRN  Quantity 5 suppositories

## 2024-04-24 NOTE — PROGRESS NOTES
[facility] Hospitalist Group  Progress Note    Patient: Jerome Javed Age: 89 y.o. : 10/18/1925 MR#: [id number] SSN: [id number]  Date/Time: 2024     Subjective: Patient lying in the bed, opens eyes on verbal commands but does not communicate or follow commands.  Trying to feed patient but she does not open now mild, tried giving her p.o. liquids but patient bites the straw but does not want to drink.  RN also tried to feed her and give her a drain but patient continues to tightly closer mouth.     Assessment/Plan:   Right distal femur fracture, post-ORIF 2024  Acute on chronic encephalopathy, worsened?  Related to anesthesia  Leukocytosis: Worsened, no signs of infection, Pro-Ruben below 0.5  Dysphagia: No tube feeds or PEG per POA, will initiate comfort feeds when patients can tolerate.  SLP consult  Acute renal failure, due to decreased p.o. intake, will continue IV fluids and monitor creatinine.  Patient already on Agustin, monitor I's/O  History of hypothyroidism-continue Synthroid  History of anemia  History of hypertension-controlled without any medications  History of dementia with failure to thrive   Advanced age  DVT prophylaxis-Lovenox  DNR    Discussed with the patient's DPOA granddaughter cross over the phone and explained about my above plan care.  Discussed about patient's current condition, dysphagia and poor p.o. intake.  Discussed about further plan of care including aggressive versus comfort/hospice care.  POA is very clear she wants to transition patient to hospice, no escalation of care, does not want any further labs checked, wants patient to be kept comfortable and plan to transition back to facility with hospice.  Hospice care consulted  Discussed with palliative care    Discussed with case management about transfer back to SNF with hospice    Disposition: Transfer back to LTC with hospice once hospice is arranged, anticipate 2024    I spent 50 minutes  Glucose 208 (H) 74 - 99 mg/dL    BUN 47 (H) 7.0 - 18 MG/DL    Creatinine 1.71 (H) 0.6 - 1.3 MG/DL    Bun/Cre Ratio 27 (H) 12 - 20      Est, Glom Filt Rate 27 (L) >60 ml/min/1.73m2    Calcium 8.5 8.5 - 10.1 MG/DL   CBC with Auto Differential    Collection Time: 04/24/24  2:46 AM   Result Value Ref Range    WBC 31.9 (H) 4.6 - 13.2 K/uL    RBC 2.62 (L) 4.20 - 5.30 M/uL    Hemoglobin 7.6 (L) 12.0 - 16.0 g/dL    Hematocrit 25.6 (L) 35.0 - 45.0 %    MCV 97.7 78.0 - 100.0 FL    MCH 29.0 24.0 - 34.0 PG    MCHC 29.7 (L) 31.0 - 37.0 g/dL    RDW 14.6 (H) 11.6 - 14.5 %    Platelets 260 135 - 420 K/uL    MPV 10.5 9.2 - 11.8 FL    Nucleated RBCs 0.2 (H) 0  WBC    nRBC 0.07 (H) 0.00 - 0.01 K/uL    Neutrophils % 86 (H) 40 - 73 %    Band Neutrophils 3 %    Lymphocytes % 3 (L) 21 - 52 %    Monocytes % 7 3 - 10 %    Eosinophils % 1 0 - 5 %    Basophils % 0 0 - 2 %    Immature Granulocytes % 0 0.0 - 0.5 %    Neutrophils Absolute 28.4 (H) 1.8 - 8.0 K/UL    Lymphocytes Absolute 1.0 0.9 - 3.6 K/UL    Monocytes Absolute 2.2 (H) 0.05 - 1.2 K/UL    Eosinophils Absolute 0.3 0.0 - 0.4 K/UL    Basophils Absolute 0.0 0.0 - 0.1 K/UL    Immature Granulocytes Absolute 0.0 0.00 - 0.04 K/UL    Differential Type MANUAL      Platelet Comment ADEQUATE PLATELETS      RBC Comment ANISOCYTOSIS  2+        RBC Comment POLYCHROMASIA  1+       Magnesium    Collection Time: 04/24/24  2:46 AM   Result Value Ref Range    Magnesium 2.3 1.6 - 2.6 mg/dL   Procalcitonin    Collection Time: 04/24/24  2:46 AM   Result Value Ref Range    Procalcitonin 0.48 ng/mL   POCT Glucose    Collection Time: 04/24/24  6:05 AM   Result Value Ref Range    POC Glucose 221 (H) 70 - 110 mg/dL   POCT Glucose    Collection Time: 04/24/24 10:53 AM   Result Value Ref Range    POC Glucose 231 (H) 70 - 110 mg/dL       Signed By: Marlon Dickerson MD     April 24, 2024      Disclaimer: Sections of this note are dictated using utilizing voice recognition software.  Minor

## 2024-04-24 NOTE — CARE COORDINATION
CM spoke with Jacqueline Huggins @ [phone],   Hospice star rated list list discussed with Ms huggins, She selected  [facility].     Verbal FOC obtained for [facility].    Dipso: [facility] with  Hospice

## 2024-04-24 NOTE — PROGRESS NOTES
1435 Hospice referral received. Chart review in progress. Waiting for CM to get hospice agency preference from patient/family. Will continue following for their decision.    1440 Left VM for CM Lyn and sent message via [employer].  Awaiting response.     Thank you for the referral to [facility]. If we can be of further assistance please contact [phone].     Elizabeth Melendez MDIV, BSN, RN  Hospice Liaison  [address], [address]  [place] [address]  Office: [phone]  Fax: [phone]  Mobile: [phone]  Email: [email or web address]

## 2024-04-24 NOTE — CARE COORDINATION
04/24/24 0857   /Social Work Whiteboard Notes   /Social Work Whiteboard RED: 4/24/24 Pt not medically ready, post-ORIF 4/23/2024. palliative consulted. [facility]po [facility]. AKI

## 2024-04-24 NOTE — PROGRESS NOTES
Ortho    Pt seen and evaluated  No reported changes    BP (!) 145/81   Pulse 86   Temp 97.8 °F (36.6 °C) (Axillary)   Resp 16   Ht 1.524 m (5')   Wt 57.2 kg (126 lb 3.2 oz)   SpO2 100%   BMI 24.65 kg/m²   Right LE  Dressing cdi  Compartments soft  Nv intact  Neg calf tenderness    Labs  Lab Results   Component Value Date    WBC 31.9 (H) 04/24/2024    HGB 7.6 (L) 04/24/2024    HCT 25.6 (L) 04/24/2024    MCV 97.7 04/24/2024     04/24/2024     A: sp orif right distal femur fx    P:  aspirin, PT-nwb. Knee immobilizer on at all times.  Continue with current medical management

## 2024-04-24 NOTE — CONSULTS
Palliative Medicine  Patient Name: Jerome Javed  YOB: 1925  MRN: [id number]  Age: 89 y.o.  Gender: female    Date of Initial Consult: 4/24/2024   Date of Service: 4/24/2024  Time: 1:53 PM  Provider: JONAS Pop NP  Hospital Day: 4  Admit Date: 4/21/2024  Referring Provider: Dr Frey       Reasons for Consultation:  Goals of Care    HISTORY OF PRESENT ILLNESS (HPI):   Jerome Javed is a 89 y.o. female with a past medical history of DM, hypertension, dementia , who was admitted on 4/21/2024 from Nursing facility  with a diagnosis of fractured right femur. Patient had a fall in the shower 2 days ago. He has been seen by orthopedics. Grand daughter wished to proceed with open reduction and internal fixation of the right distal femur for pain control and quality of life. Palliative medicine is consulted for goals of care.         PALLIATIVE DIAGNOSES:    Goals of care / advanced care plan discussion   Fracture right femur  Dementia   Advanced age   Debility     ASSESSMENT AND PLAN:   Goals of care / advanced care plan discussions     4/24/2024 Seen along with Ms Ryley RN. Patient was reclining in bed. Occassional moaning noted. Non verbal at our visit. She did not follow commands. Patient is not able to participate in his goals of care discussions. She appears comfortable even with occassional moaning. Social: patient lives at nursing facility. AMD there is no AMD on file and reportedly the grand daughter marco is MPOA. Patient is not currently able to execute an AMD. Appreciate conversation by attending. We note granddaughter would like to transition patient to hospice, no escalation of care, no further labs. We have tried x 3 to call grand daughter however no answer and no ability to leave a message. After chart review DDNR was found, however if we can speak with Marco will complete POLST. Goals of care DNR/DNI no escalation of care, hospice has been consulted.   Initial consult note

## 2024-04-24 NOTE — CARE COORDINATION
Call made to [place] transportation, [phone], spoke with Joe, will transport patient on 4/25/2024 at 10:00 am.

## 2024-04-25 ENCOUNTER — HOME CARE VISIT (OUTPATIENT)
Age: 89
End: 2024-04-25
Payer: MEDICARE

## 2024-04-25 VITALS
DIASTOLIC BLOOD PRESSURE: 64 MMHG | HEIGHT: 60 IN | TEMPERATURE: 98 F | WEIGHT: 126.2 LBS | RESPIRATION RATE: 16 BRPM | SYSTOLIC BLOOD PRESSURE: 128 MMHG | BODY MASS INDEX: 24.77 KG/M2 | HEART RATE: 79 BPM | OXYGEN SATURATION: 99 %

## 2024-04-25 LAB — GLUCOSE BLD STRIP.AUTO-MCNC: 139 MG/DL (ref 70–110)

## 2024-04-25 PROCEDURE — 94761 N-INVAS EAR/PLS OXIMETRY MLT: CPT

## 2024-04-25 PROCEDURE — 2580000003 HC RX 258: Performed by: HOSPITALIST

## 2024-04-25 PROCEDURE — 2500000001 HSPC NON INJECTABLE MED

## 2024-04-25 PROCEDURE — 2580000003 HC RX 258: Performed by: ORTHOPAEDIC SURGERY

## 2024-04-25 PROCEDURE — 82962 GLUCOSE BLOOD TEST: CPT

## 2024-04-25 PROCEDURE — 2500000003 HC RX 250 WO HCPCS: Performed by: ORTHOPAEDIC SURGERY

## 2024-04-25 PROCEDURE — 2580000003 HC RX 258: Performed by: PHYSICIAN ASSISTANT

## 2024-04-25 PROCEDURE — 6360000002 HC RX W HCPCS: Performed by: INTERNAL MEDICINE

## 2024-04-25 PROCEDURE — 2700000000 HC OXYGEN THERAPY PER DAY

## 2024-04-25 PROCEDURE — 0651 HSPC ROUTINE HOME CARE

## 2024-04-25 PROCEDURE — 99239 HOSP IP/OBS DSCHRG MGMT >30: CPT | Performed by: HOSPITALIST

## 2024-04-25 PROCEDURE — 6360000002 HC RX W HCPCS: Performed by: PHYSICIAN ASSISTANT

## 2024-04-25 PROCEDURE — G0299 HHS/HOSPICE OF RN EA 15 MIN: HCPCS

## 2024-04-25 RX ADMIN — MORPHINE SULFATE 1 MG: 2 INJECTION, SOLUTION INTRAMUSCULAR; INTRAVENOUS at 01:58

## 2024-04-25 RX ADMIN — SODIUM CHLORIDE, PRESERVATIVE FREE 10 ML: 5 INJECTION INTRAVENOUS at 07:50

## 2024-04-25 RX ADMIN — MORPHINE SULFATE 1 MG: 2 INJECTION, SOLUTION INTRAMUSCULAR; INTRAVENOUS at 07:49

## 2024-04-25 RX ADMIN — LORAZEPAM 0.5 MG: 2 INJECTION, SOLUTION INTRAMUSCULAR; INTRAVENOUS at 07:49

## 2024-04-25 RX ADMIN — SODIUM CHLORIDE: 9 INJECTION, SOLUTION INTRAVENOUS at 07:54

## 2024-04-25 RX ADMIN — SODIUM CHLORIDE, PRESERVATIVE FREE 20 MG: 5 INJECTION INTRAVENOUS at 07:50

## 2024-04-25 ASSESSMENT — PAIN SCALES - PAIN ASSESSMENT IN ADVANCED DEMENTIA (PAINAD)
BODYLANGUAGE: RELAXED
TOTALSCORE: 1
CONSOLABILITY: NO NEED TO CONSOLE
BODYLANGUAGE: RELAXED
BREATHING: NORMAL
FACIALEXPRESSION: SMILING OR INEXPRESSIVE
TOTALSCORE: 1
NEGVOCALIZATION: OCCASIONAL MOAN/GROAN, LOW SPEECH, NEGATIVE/DISAPPROVING QUALITY
BREATHING: NORMAL
CONSOLABILITY: NO NEED TO CONSOLE
FACIALEXPRESSION: SMILING OR INEXPRESSIVE
NEGVOCALIZATION: OCCASIONAL MOAN/GROAN, LOW SPEECH, NEGATIVE/DISAPPROVING QUALITY

## 2024-04-25 ASSESSMENT — PAIN - FUNCTIONAL ASSESSMENT
PAIN_FUNCTIONAL_ASSESSMENT: PREVENTS OR INTERFERES WITH MANY ACTIVE NOT PASSIVE ACTIVITIES
PAIN_FUNCTIONAL_ASSESSMENT: ACTIVITIES ARE NOT PREVENTED

## 2024-04-25 ASSESSMENT — PAIN DESCRIPTION - ONSET: ONSET: UNABLE TO COMMUNICATE

## 2024-04-25 ASSESSMENT — PAIN DESCRIPTION - PAIN TYPE: TYPE: SURGICAL PAIN

## 2024-04-25 ASSESSMENT — PAIN DESCRIPTION - DESCRIPTORS: DESCRIPTORS: OTHER (COMMENT)

## 2024-04-25 ASSESSMENT — ENCOUNTER SYMPTOMS
HEMOPTYSIS: 0
PAIN LOCATION - PAIN QUALITY: SHARP

## 2024-04-25 ASSESSMENT — PAIN DESCRIPTION - ORIENTATION: ORIENTATION: OTHER (COMMENT)

## 2024-04-25 ASSESSMENT — PAIN DESCRIPTION - LOCATION: LOCATION: OTHER (COMMENT)

## 2024-04-25 ASSESSMENT — PAIN DESCRIPTION - FREQUENCY: FREQUENCY: OTHER (COMMENT)

## 2024-04-25 ASSESSMENT — PAIN SCALES - GENERAL
PAINLEVEL_OUTOF10: 1
PAINLEVEL_OUTOF10: 0
PAINLEVEL_OUTOF10: 5

## 2024-04-25 ASSESSMENT — PAIN DESCRIPTION - DIRECTION: RADIATING_TOWARDS: UTA

## 2024-04-25 NOTE — DISCHARGE INSTRUCTIONS
DISCHARGE SUMMARY from Nurse    PATIENT INSTRUCTIONS:    After general anesthesia or intravenous sedation, for 24 hours or while taking prescription Narcotics:  Limit your activities  Do not drive and operate hazardous machinery  Do not make important personal or business decisions  Do  not drink alcoholic beverages  If you have not urinated within 8 hours after discharge, please contact your surgeon on call.    Report the following to your surgeon:  Excessive pain, swelling, redness or odor of or around the surgical area  Temperature over 100.5  Nausea and vomiting lasting longer than 4 hours or if unable to take medications  Any signs of decreased circulation or nerve impairment to extremity: change in color, persistent  numbness, tingling, coldness or increase pain  Any questions    What to do at Home:  Recommended activity: activity as tolerated, as directed    If you experience any of the following symptoms Discharging Hospice [facility], please follow up with [facility].    *  Please give a list of your current medications to your Primary Care Provider.    *  Please update this list whenever your medications are discontinued, doses are      changed, or new medications (including over-the-counter products) are added.    *  Please carry medication information at all times in case of emergency situations.    These are general instructions for a healthy lifestyle:    No smoking/ No tobacco products/ Avoid exposure to second hand smoke  Surgeon General's Warning:  Quitting smoking now greatly reduces serious risk to your health.    Obesity, smoking, and sedentary lifestyle greatly increases your risk for illness    A healthy diet, regular physical exercise & weight monitoring are important for maintaining a healthy lifestyle    You may be retaining fluid if you have a history of heart failure or if you experience any of the following symptoms:  Weight gain of 3 pounds or more  overnight or 5 pounds in a week, increased swelling in our hands or feet or shortness of breath while lying flat in bed.  Please call your doctor as soon as you notice any of these symptoms; do not wait until your next office visit.    Patient armband removed and shredded   Thank you for enrolling in [employer]. Please follow the instructions below to securely access your online medical record. [employer] allows you to send messages to your doctor, view your test results, renew your prescriptions, schedule appointments, and more.     How Do I Sign Up?  In your Internet browser, go to [email or web address]  Click on the Sign Up Now link in the Sign In box. You will see the New Member Sign Up page.  Enter your [employer] Access Code exactly as it appears below. You will not need to use this code after you’ve completed the sign-up process. If you do not sign up before the expiration date, you must request a new code.  [employer] Access Code: [email or web address]  Expires: 6/5/2024  9:53 AM    Enter your Social Security Number ([id number]) and Date of Birth (mm/dd/yyyy) as indicated and click Submit. You will be taken to the next sign-up page.  Create a [employer] ID. This will be your [employer] login ID and cannot be changed, so think of one that is secure and easy to remember.  Create a [employer] password. You can change your password at any time.  Enter your Password Reset Question and Answer. This can be used at a later time if you forget your password.   Enter your e-mail address. You will receive e-mail notification when new information is available in [employer].  Click Sign Up. You can now view your medical record.     Additional Information  If you have questions, please contact your physician practice where you receive care. Remember, [employer] is NOT to be used for urgent needs. For medical emergencies, dial 911.   The discharge information has been reviewed with the {PATIENT PARENT GUARDIAN:51962}.  The {PATIENT

## 2024-04-25 NOTE — HOSPICE
Hospice Admission Summary  Jerome Wang, 89 year old female, admitted to Hospice services for a terminal diagnosis of advance Alzheimer’s dementia.  Patient has elected hospice services and is no longer seeking aggressive treatment.  Co-morbidities related to the terminal diagnosis are debility, Other fracture of right femur, initial encounter for closed fracture, recent fall and r hip fracture, malnutrition, encephalopathy, renal failure, anemia.  Patient also has a past medical history of diabetes, hypertension, and hypothyroidism.     Pt arrived to the ED via EMS from [facility] and rehab c/o right femur fx from  witnessed fall on 4/19 in the shower. Pt has dementia.Pt had an ORIF on 4/24. Pt has a sx wound with 49 staples and an immobilizer. Pt was admitted back to [facility] and rehab 4/25. Pt is nonvererbal. But screams in pain during ADLs or any ROM.    [facility] and willing and safely able to provide care and administer medications, their availability is daily. TIBURCIO Tomas participated in goal setting, care planning, and are agreeable to the care plan.  Admission booklet reviewed with TIBURCIO Tomas; services provided under hospice benefit, review of rights and responsibilities, disposal of medications, contact information for , Joint Commission, Medicare, QIO, and outside resources for independence.  The TIBURCIO Tomas was educated on IDT and their right to attend meetings.  Education provided regarding 24-hour availability of hospice services and on-call number provided. Hospice facility and granddaughter verbalized understanig.    Attending/ Medical Director:  Dr. Jett Lott  Level of Care:  skilled nursing facility  Advance Directives:  DNR  :  n/a  Allergies:  n/a  MAC:  23  Height:  1.524 m (5')  Weight:  57.2 kg (126 lb 3.2 oz)  PPS:  30%  FAST:    NYHA:  N/A   EF%:  N/A   Tobacco usage:  N/A  Functional status:  max

## 2024-04-25 NOTE — PROGRESS NOTES
Ortho    Pt seen and evaluated  No reported changes    /64   Pulse 79   Temp 98 °F (36.7 °C) (Axillary)   Resp 16   Ht 1.524 m (5')   Wt 57.2 kg (126 lb 3.2 oz)   SpO2 99%   BMI 24.65 kg/m²   Right LE  Dressing cdi  Compartments soft  Nv intact  Neg calf tenderness    Labs  Lab Results   Component Value Date    WBC 31.9 (H) 04/24/2024    HGB 7.6 (L) 04/24/2024    HCT 25.6 (L) 04/24/2024    MCV 97.7 04/24/2024     04/24/2024     A: sp orif right distal femur fx    P:  aspirin, PT-nwb.  Continue with current medical management.

## 2024-04-25 NOTE — PROGRESS NOTES
Verbal report given to Sydni LOMBARDI from [facility]. Report included the following information Nurse Handoff Report, Intake/Output, MAR, and Recent Results. All questions answered. Pt prescriptions sent with patient through transport.

## 2024-04-26 ENCOUNTER — HOME CARE VISIT (OUTPATIENT)
Age: 89
End: 2024-04-26
Payer: MEDICARE

## 2024-04-26 VITALS
SYSTOLIC BLOOD PRESSURE: 80 MMHG | RESPIRATION RATE: 17 BRPM | OXYGEN SATURATION: 100 % | TEMPERATURE: 97.5 F | HEART RATE: 56 BPM | DIASTOLIC BLOOD PRESSURE: 56 MMHG

## 2024-04-26 VITALS
DIASTOLIC BLOOD PRESSURE: 53 MMHG | TEMPERATURE: 98.4 F | RESPIRATION RATE: 19 BRPM | SYSTOLIC BLOOD PRESSURE: 79 MMHG | HEART RATE: 89 BPM | OXYGEN SATURATION: 95 %

## 2024-04-26 PROCEDURE — G0156 HHCP-SVS OF AIDE,EA 15 MIN: HCPCS

## 2024-04-26 PROCEDURE — 2500000001 HSPC NON INJECTABLE MED

## 2024-04-26 PROCEDURE — G0299 HHS/HOSPICE OF RN EA 15 MIN: HCPCS

## 2024-04-26 PROCEDURE — 0651 HSPC ROUTINE HOME CARE

## 2024-04-26 ASSESSMENT — ENCOUNTER SYMPTOMS: SKIN LESIONS: 1

## 2024-04-26 NOTE — HOSPICE
Patient was a sleep and didnt awaken to my voice. Facility staff let me kniw that she had not eaten today and would not wake up to eat.  prayed and will continue to follow.

## 2024-04-26 NOTE — HOSPICE
Patient in gerichair in [facility], oxygen via nc tolerated well. Patient is only responsive to tactile stimulation and did not speak or open her eyes but did move her arm. Facility nurse Anirudh states she has not ate or drank anything today and has also not taken her medications. Patient appears in no distress or discomfort. Anirudh reports no PRN meds have been given this shift as she has been comfortable. With patients change in condition patient will be placed on daily visits x3 days and then reassessed to see if patient is immient. Office made aware. Granddaughter Jacqueline updated and made aware. EOL education provided. Will need reinforcement.

## 2024-04-27 ENCOUNTER — HOME CARE VISIT (OUTPATIENT)
Age: 89
End: 2024-04-27
Payer: MEDICARE

## 2024-04-27 VITALS
RESPIRATION RATE: 16 BRPM | HEART RATE: 80 BPM | TEMPERATURE: 97.8 F | OXYGEN SATURATION: 96 % | DIASTOLIC BLOOD PRESSURE: 84 MMHG | SYSTOLIC BLOOD PRESSURE: 137 MMHG

## 2024-04-27 PROCEDURE — 0651 HSPC ROUTINE HOME CARE

## 2024-04-27 PROCEDURE — G0299 HHS/HOSPICE OF RN EA 15 MIN: HCPCS

## 2024-04-28 ENCOUNTER — HOME CARE VISIT (OUTPATIENT)
Age: 89
End: 2024-04-28
Payer: MEDICARE

## 2024-04-28 VITALS
OXYGEN SATURATION: 94 % | HEART RATE: 82 BPM | DIASTOLIC BLOOD PRESSURE: 50 MMHG | RESPIRATION RATE: 16 BRPM | SYSTOLIC BLOOD PRESSURE: 90 MMHG | TEMPERATURE: 97 F

## 2024-04-28 PROCEDURE — 0651 HSPC ROUTINE HOME CARE

## 2024-04-28 PROCEDURE — G0299 HHS/HOSPICE OF RN EA 15 MIN: HCPCS

## 2024-04-29 ENCOUNTER — HOME CARE VISIT (OUTPATIENT)
Age: 89
End: 2024-04-29
Payer: MEDICARE

## 2024-04-29 PROCEDURE — 0651 HSPC ROUTINE HOME CARE

## 2024-04-29 PROCEDURE — G0299 HHS/HOSPICE OF RN EA 15 MIN: HCPCS

## 2024-04-29 PROCEDURE — G0155 HHCP-SVS OF CSW,EA 15 MIN: HCPCS

## 2024-04-30 ENCOUNTER — HOME CARE VISIT (OUTPATIENT)
Age: 89
End: 2024-04-30
Payer: MEDICARE

## 2024-04-30 VITALS
HEART RATE: 51 BPM | RESPIRATION RATE: 18 BRPM | SYSTOLIC BLOOD PRESSURE: 137 MMHG | TEMPERATURE: 97.1 F | DIASTOLIC BLOOD PRESSURE: 95 MMHG

## 2024-04-30 VITALS — RESPIRATION RATE: 16 BRPM | TEMPERATURE: 97.3 F | HEART RATE: 76 BPM

## 2024-04-30 PROCEDURE — G0299 HHS/HOSPICE OF RN EA 15 MIN: HCPCS

## 2024-04-30 PROCEDURE — 0651 HSPC ROUTINE HOME CARE

## 2024-04-30 PROCEDURE — G0156 HHCP-SVS OF AIDE,EA 15 MIN: HCPCS

## 2024-04-30 ASSESSMENT — ENCOUNTER SYMPTOMS
SKIN LESIONS: 1
BOWEL INCONTINENCE: 1

## 2024-04-30 NOTE — HOSPICE
MSW to follow each month to assess any supportive needs and provide support verbalized. MSW will continue to follow up.

## 2024-04-30 NOTE — HOSPICE
Patient is transitioning, will be placed on daily visits for now, will continue to assess status daily    Granddaughter states that she visited patient on saturday and that patient was opening her eyes briefly and trying to sit up in the bed. Facility nurse Isis was not sure how patient was over the weekend but states she would leave me report to check tomorrow. CNA reports she just fed patient some of her breakfast and she tolerated it with no problem. Patient appears comfortable this visit. O2 via NC tolerated well. Unable to obtain BP and o2 sat, all other vitals WNL. If patient does not take medication today will get order to DC meds tomorrow. Instructed staff and granddaughter to call hospice with any questions or concerns and at time of death.      FAST 7A

## 2024-05-01 PROCEDURE — 0651 HSPC ROUTINE HOME CARE

## 2024-05-02 ENCOUNTER — HOME CARE VISIT (OUTPATIENT)
Age: 89
End: 2024-05-02
Payer: MEDICARE

## 2024-05-02 VITALS
HEART RATE: 60 BPM | TEMPERATURE: 97.1 F | SYSTOLIC BLOOD PRESSURE: 155 MMHG | DIASTOLIC BLOOD PRESSURE: 133 MMHG | RESPIRATION RATE: 17 BRPM

## 2024-05-02 PROCEDURE — 0651 HSPC ROUTINE HOME CARE

## 2024-05-02 PROCEDURE — G0299 HHS/HOSPICE OF RN EA 15 MIN: HCPCS

## 2024-05-02 ASSESSMENT — ENCOUNTER SYMPTOMS
PAIN LOCATION - PAIN QUALITY: SORE
SKIN LESIONS: 1
BOWEL INCONTINENCE: 1

## 2024-05-02 NOTE — HOSPICE
PAtient was up and staff says that she ate breakfast this morning. Not a long visitr due to her room mate getting treatment.  will continue to follow.

## 2024-05-02 NOTE — HOSPICE
Patient/Caregiver/Family/Facility findings/issues during SN visit:  high BP    Medication refills ordered this visit: n/a    Medications reconciled and all medications are available in the home this visit.  The following education was provided regarding medications, medication interactions, and look alike medications.  Response to teaching: caregiver verbalized understanding. Medications are effective at this time.      Supplies by type and quantity ordered this visit include: Order Number : [id number] briefs, wipes, irivn pads, zinc paste    Consulted medical director/attending physician regarding: n/a    Instructed patient/family/caregiver on 24-hour hospice availability and phone number.    Plan for next visit:  follow up

## 2024-05-03 ENCOUNTER — HOME CARE VISIT (OUTPATIENT)
Age: 89
End: 2024-05-03
Payer: MEDICARE

## 2024-05-03 PROCEDURE — G0156 HHCP-SVS OF AIDE,EA 15 MIN: HCPCS

## 2024-05-03 PROCEDURE — 0651 HSPC ROUTINE HOME CARE

## 2024-05-04 PROCEDURE — 0651 HSPC ROUTINE HOME CARE

## 2024-05-05 PROCEDURE — 0651 HSPC ROUTINE HOME CARE

## 2024-05-07 ENCOUNTER — HOME CARE VISIT (OUTPATIENT)
Age: 89
End: 2024-05-07
Payer: MEDICARE

## 2024-05-07 PROCEDURE — G0156 HHCP-SVS OF AIDE,EA 15 MIN: HCPCS

## 2024-05-07 PROCEDURE — G0299 HHS/HOSPICE OF RN EA 15 MIN: HCPCS

## 2024-05-08 ENCOUNTER — OFFICE VISIT (OUTPATIENT)
Age: 89
End: 2024-05-08
Payer: MEDICARE

## 2024-05-08 ENCOUNTER — HOME CARE VISIT (OUTPATIENT)
Age: 89
End: 2024-05-08
Payer: MEDICARE

## 2024-05-08 VITALS
TEMPERATURE: 97.7 F | DIASTOLIC BLOOD PRESSURE: 74 MMHG | RESPIRATION RATE: 17 BRPM | HEART RATE: 66 BPM | SYSTOLIC BLOOD PRESSURE: 154 MMHG

## 2024-05-08 DIAGNOSIS — S72.401D CLOSED FRACTURE OF DISTAL END OF RIGHT FEMUR WITH ROUTINE HEALING, UNSPECIFIED FRACTURE MORPHOLOGY, SUBSEQUENT ENCOUNTER: ICD-10-CM

## 2024-05-08 DIAGNOSIS — Z48.02 ENCOUNTER FOR STAPLE REMOVAL: ICD-10-CM

## 2024-05-08 DIAGNOSIS — Z48.89 ENCOUNTER FOR POSTOPERATIVE CARE: Primary | ICD-10-CM

## 2024-05-08 DIAGNOSIS — G89.18 POST-OP PAIN: ICD-10-CM

## 2024-05-08 PROCEDURE — 73560 X-RAY EXAM OF KNEE 1 OR 2: CPT | Performed by: PHYSICIAN ASSISTANT

## 2024-05-08 PROCEDURE — 99024 POSTOP FOLLOW-UP VISIT: CPT | Performed by: PHYSICIAN ASSISTANT

## 2024-05-08 ASSESSMENT — ENCOUNTER SYMPTOMS
BOWEL INCONTINENCE: 1
BOWEL INCONTINENCE: 1
SKIN LESIONS: 1

## 2024-05-08 NOTE — HOSPICE
Facility findings/issues during SN visit:  No concerns reported    Medication refills ordered this visit: none requested    Medications reconciled and all medications are available in the home this visit. Medications are effective at this time.      Supplies by type and quantity ordered this visit include: none requested        Instructed patient/family/caregiver on 24-hour hospice availability and phone number.    Plan for next visit: EOL education and caregiver support

## 2024-05-08 NOTE — HOSPICE
Patient being fed oatmeal upon arrival. CNA reports she ate all breakfast. Patient is alert, oriented only to person. Confused and calling out during visit.. HHA present during visit and completed bed bath. Patient had a BM. Agustin catheter intact with yellow urine in drainage bag. Patient has immobilizer to right leg- wound care being managed by facility staff. Stage 2 to sacrum that had zinc cream applied and will be applied at every cahnge. 2 tubes of zinc paste in drawer at this time. Patient refused to wear oxygen the duration of the visit and was not SOB. Patient FLACC score 4, did not want medication. Report given to facility staff Anirudh LPN who reports no changes since last nurse visit.       Medications: none needed at this time    Supplies: briefs, wipes, body wash and barrier cream ordered via [employer], confirmation number: [id number]

## 2024-05-08 NOTE — PROGRESS NOTES
MG/ML concentrated solution, Take 0.25 mLs by mouth every 4 hours as needed for Agitation, Anxiety or Shortness of Breath for up to 14 days. Max Daily Amount: 3 mg, Disp: 30 mL, Rfl: 0    aspirin 81 MG EC tablet, Take 1 tablet by mouth in the morning and at bedtime, Disp: 30 tablet, Rfl: 3    polyethylene glycol (GLYCOLAX) 17 g packet, Take 1 packet by mouth daily as needed for Constipation, Disp: 527 g, Rfl: 1    sennosides-docusate sodium (SENOKOT-S) 8.6-50 MG tablet, Take 1 tablet by mouth 2 times daily, Disp: 60 tablet, Rfl: 0    hydrOXYzine HCl (ATARAX) 25 MG tablet, Take 1 tablet by mouth daily Take one tab po daily at night for urticaria , Disp: , Rfl:     levothyroxine (SYNTHROID) 75 MCG tablet, Take 1 tablet by mouth Daily, Disp: , Rfl:     sertraline (ZOLOFT) 25 MG tablet, Take 1 tablet by mouth in the morning and at bedtime, Disp: , Rfl:     ergocalciferol (ERGOCALCIFEROL) 1.25 MG (20965 UT) capsule, Take by mouth every 7 days, Disp: , Rfl:     No Known Allergies    Social History     Socioeconomic History    Marital status: Single     Spouse name: Not on file    Number of children: Not on file    Years of education: Not on file    Highest education level: Not on file   Occupational History    Not on file   Tobacco Use    Smoking status: Never    Smokeless tobacco: Never   Substance and Sexual Activity    Alcohol use: No    Drug use: No    Sexual activity: Not on file   Other Topics Concern    Not on file   Social History Narrative    Not on file     Social Determinants of Health     Financial Resource Strain: Not on file   Food Insecurity: Patient Unable To Answer (4/21/2024)    Hunger Vital Sign     Worried About Running Out of Food in the Last Year: Patient unable to answer     Ran Out of Food in the Last Year: Patient unable to answer   Transportation Needs: Patient Unable To Answer (4/21/2024)    PRAPARE - Transportation     Lack of Transportation (Medical): Patient unable to answer     Lack of

## 2024-05-09 ENCOUNTER — HOME CARE VISIT (OUTPATIENT)
Age: 89
End: 2024-05-09
Payer: MEDICARE

## 2024-05-09 VITALS
OXYGEN SATURATION: 90 % | SYSTOLIC BLOOD PRESSURE: 122 MMHG | DIASTOLIC BLOOD PRESSURE: 57 MMHG | RESPIRATION RATE: 22 BRPM | HEART RATE: 90 BPM | TEMPERATURE: 98 F

## 2024-05-09 PROCEDURE — G0299 HHS/HOSPICE OF RN EA 15 MIN: HCPCS

## 2024-05-09 ASSESSMENT — ENCOUNTER SYMPTOMS: BOWEL INCONTINENCE: 1

## 2024-05-09 NOTE — HOSPICE
Patient/Caregiver/Family/Facility findings/issues during SN visit:  None identified    Medication refills ordered this visit: hyosycamine ordered.  Order written and given to nurse to enter into the computer    Medications reconciled and all medications are available in the home this visit.  Education was provided regarding medications, medication interactions, and look alike medications.   Response to teaching.  Facility staff verbalized understanding.   Medications are effective at this time.      Supplies by type and quantity ordered this visit include: none needed    Consulted medical director/attending physician regarding:  Not at this time    Instructed patient/family/caregiver on 24-hour hospice availability and phone number.    Plan for next visit:  Continued EOL education and support.  Patient went to surgeon's office yesterday to have staples removed .  Steri strips the entire length of right hip incision.  Suction at bedside.  Nurse states that she had to suction her yesterday because of secretions.  Jacqueline Huggins, patient's grand daughter called and updated.

## 2024-05-10 ENCOUNTER — HOME CARE VISIT (OUTPATIENT)
Age: 89
End: 2024-05-10
Payer: MEDICARE

## 2024-05-10 PROCEDURE — G0156 HHCP-SVS OF AIDE,EA 15 MIN: HCPCS

## 2024-05-13 NOTE — HOSPICE
Patient is imminent.  Received call from Paula at \[facility]\"" stating patient had a change in condition. Upon arrival patient is unresponsive in bed. Roommate reports patient has not ate/drank or talked since Tuesday. Vitals WNL. No signs of pain noted. Nurse states that morphine was given last night. Nurse called patients granddaughter Jacqueline and made her aware of patients imminent status. Jacqueline states she is going to book a flight and get here as soon as she can. EOL education provided to Jacqueline and facility nurse. Facility nurse stated that our o2 concentrator was not working and that currently they are using theirs. Service ordered placed. Instructed to call hospice with any questions or concerns and at time of death.      Medications: none needed    Supplies: service order for concentrator via [facility] [place] [place] conf number [id number]

## 2024-05-14 ENCOUNTER — HOME CARE VISIT (OUTPATIENT)
Age: 89
End: 2024-05-14
Payer: MEDICARE

## 2024-05-14 NOTE — HOSPICE
Postmortem care provided to patient.  Patient's family was not present - granddaughter will arrive tomorrow 24.  Patient and family's postmortem Cheondoism and cultural wishes maintained. The following were removed from patient body: urinary catheter and discarded in biohazard bag.  Eyes gently closed. Soft rolled towel placed under patient chin.  HOB raised to 30 degrees.      Encouraged facility staff to spend time with  patient.  Provided emotional support to family and staff.       Granddaughter is appropriately grieving.    [facility]  Services picked up remains.

## (undated) DEVICE — GUIDEWIRE ORTH DIA2.5MM LOK SMOOTH DRL TIP FLX THRD FOR

## (undated) DEVICE — 450 ML BOTTLE OF 0.05% CHLORHEXIDINE GLUCONATE IN 99.95% STERILE WATER FOR IRRIGATION, USP AND APPLICATOR.: Brand: IRRISEPT ANTIMICROBIAL WOUND LAVAGE

## (undated) DEVICE — GOWN ,SIRUS ,NONREINFORCED 4XL: Brand: MEDLINE

## (undated) DEVICE — GAUZE,SPONGE,4"X4",16PLY,STRL,LF,10/TRAY: Brand: MEDLINE

## (undated) DEVICE — BLADE CLIPPER GEN PURP NS

## (undated) DEVICE — BIT DRL L180MM DIA4.3MM QUIK CPL W/O STP REUSE

## (undated) DEVICE — DRAPE TWL SURG 16X26IN BLU ORB04] ALLCARE INC]

## (undated) DEVICE — DRAPE C ARM UNIV W41XL74IN CLR PLAS XR VELC CLSR POLY STRP

## (undated) DEVICE — SUTURE VICRYL SZ 0 L27IN ABSRB UD L36MM CT-1 1/2 CIR J260H

## (undated) DEVICE — IMMOBILIZER KNEE PREMIER PRO TRI PNL 24INCH FOAM TIETEX PAT

## (undated) DEVICE — KIT OR TURNOVER

## (undated) DEVICE — SUTURE VICRYL + SZ 1-0 L36IN ABSRB UD CTX 1/2 CIR TAPR PNT VCP977H

## (undated) DEVICE — STRYKER PERFORMANCE SERIES SAGITTAL BLADE: Brand: STRYKER PERFORMANCE SERIES

## (undated) DEVICE — SOLUTION SCRB 4OZ 4% CHG CLN BASE FOR PT SKIN ANTISEPSIS

## (undated) DEVICE — INTENDED FOR TISSUE SEPARATION, AND OTHER PROCEDURES THAT REQUIRE A SHARP SURGICAL BLADE TO PUNCTURE OR CUT.: Brand: BARD-PARKER ® CARBON RIB-BACK BLADES

## (undated) DEVICE — SUTURE VICRYL SZ 2 L27IN ABSRB VLT L65MM TP-1 1/2 CIR J649G

## (undated) DEVICE — BIT DRL L145MM DIA3.2MM QUIK CPL W/O STP REUSE

## (undated) DEVICE — PREMIUM DRY TRAY LF: Brand: MEDLINE INDUSTRIES, INC.

## (undated) DEVICE — SOLUTION IRRIG 1000ML 0.9% SOD CHL USP POUR PLAS BTL

## (undated) DEVICE — BLADE ES L2.75IN ELASTOMERIC COAT DURABLE BEND UPTO 90DEG

## (undated) DEVICE — Device

## (undated) DEVICE — SUTURE MONOCRYL SZ 2-0 L36IN ABSRB UD L36MM CT-1 1/2 CIR Y945H

## (undated) DEVICE — PACK PROCEDURE SURG TOT HIP BSHR LF

## (undated) DEVICE — ELECTRODE PT RET AD L9FT HI MOIST COND ADH HYDRGEL CORDED

## (undated) DEVICE — THREE-QUARTER SHEET: Brand: CONVERTORS

## (undated) DEVICE — PAD,ABDOMINAL,8"X10",ST,LF: Brand: MEDLINE

## (undated) DEVICE — SUTURE VICRYL + SZ 2 L27IN ABSRB UD TP-1 L65MM 1/2 CIR TAPR VCP849G

## (undated) DEVICE — APPLICATOR MEDICATED 26 CC SOLUTION HI LT ORNG CHLORAPREP

## (undated) DEVICE — DRESSING,GAUZE,XEROFORM,CURAD,1"X8",ST: Brand: CURAD

## (undated) DEVICE — WEREWOLF FASTSEAL 6.0 HEMOSTASIS WAND: Brand: FASTSEAL 6.0 HEMOSTASIS WAND

## (undated) DEVICE — INTENDED FOR TISSUE SEPARATION, AND OTHER PROCEDURES THAT REQUIRE A SHARP SURGICAL BLADE TO PUNCTURE OR CUT.: Brand: BARD-PARKER SAFETY BLADES SIZE 15, STERILE